# Patient Record
Sex: MALE | Race: WHITE
[De-identification: names, ages, dates, MRNs, and addresses within clinical notes are randomized per-mention and may not be internally consistent; named-entity substitution may affect disease eponyms.]

---

## 2018-08-03 ENCOUNTER — HOSPITAL ENCOUNTER (INPATIENT)
Dept: HOSPITAL 36 - ER | Age: 83
LOS: 10 days | Discharge: NURSING, REHAB OR LONG-TERM CARE | DRG: 885 | End: 2018-08-13
Attending: PSYCHIATRY & NEUROLOGY | Admitting: PSYCHIATRY & NEUROLOGY
Payer: MEDICARE

## 2018-08-03 VITALS — DIASTOLIC BLOOD PRESSURE: 75 MMHG | SYSTOLIC BLOOD PRESSURE: 144 MMHG

## 2018-08-03 DIAGNOSIS — F29: Primary | ICD-10-CM

## 2018-08-03 DIAGNOSIS — D50.9: ICD-10-CM

## 2018-08-03 DIAGNOSIS — I25.10: ICD-10-CM

## 2018-08-03 DIAGNOSIS — M19.90: ICD-10-CM

## 2018-08-03 DIAGNOSIS — K21.9: ICD-10-CM

## 2018-08-03 DIAGNOSIS — I10: ICD-10-CM

## 2018-08-03 DIAGNOSIS — I48.91: ICD-10-CM

## 2018-08-03 DIAGNOSIS — F32.9: ICD-10-CM

## 2018-08-03 DIAGNOSIS — F03.91: ICD-10-CM

## 2018-08-03 DIAGNOSIS — F41.9: ICD-10-CM

## 2018-08-03 DIAGNOSIS — Z82.49: ICD-10-CM

## 2018-08-03 LAB
ALBUMIN SERPL-MCNC: 3.7 GM/DL (ref 4.2–5.5)
ALBUMIN/GLOB SERPL: 1.2 {RATIO} (ref 1–1.8)
ALP SERPL-CCNC: 70 U/L (ref 34–104)
ALT SERPL-CCNC: 5 U/L (ref 7–52)
AMPHET UR-MCNC: NEGATIVE NG/ML
ANION GAP SERPL CALC-SCNC: 13 MMOL/L (ref 7–16)
APPEARANCE UR: CLEAR
AST SERPL-CCNC: 10 U/L (ref 13–39)
BACTERIA #/AREA URNS HPF: (no result) /HPF
BARBITURATES UR-MCNC: NEGATIVE UG/ML
BASOPHILS # BLD AUTO: 0 TH/CUMM (ref 0–0.2)
BASOPHILS NFR BLD AUTO: 0 % (ref 0–2)
BENZODIAZEPINES PNL UR: NEGATIVE
BILIRUB SERPL-MCNC: 1 MG/DL (ref 0.3–1)
BILIRUB UR-MCNC: NEGATIVE MG/DL
BUN SERPL-MCNC: 21 MG/DL (ref 7–25)
CALCIUM SERPL-MCNC: 9.3 MG/DL (ref 8.6–10.3)
CANNABINOIDS SERPL QL CFM: NEGATIVE
CHLORIDE SERPL-SCNC: 95 MEQ/L (ref 98–107)
CO2 SERPL-SCNC: 27.7 MEQ/L (ref 21–31)
COCAINE METAB.OTHER UR-MCNC: NEGATIVE NG/ML
COLOR UR: YELLOW
CREAT SERPL-MCNC: 1.1 MG/DL (ref 0.7–1.3)
EOSINOPHIL # BLD AUTO: 0.1 TH/CMM (ref 0.1–0.4)
EOSINOPHIL NFR BLD AUTO: 1 % (ref 0–5)
EPI CELLS URNS QL MICRO: (no result) /LPF
ERYTHROCYTE [DISTWIDTH] IN BLOOD BY AUTOMATED COUNT: 14.3 % (ref 11.5–20)
GLOBULIN SER-MCNC: 3.1 GM/DL
GLUCOSE SERPL-MCNC: 102 MG/DL (ref 70–105)
GLUCOSE UR STRIP-MCNC: NEGATIVE MG/DL
HCT VFR BLD CALC: 38.7 % (ref 41–60)
HGB BLD-MCNC: 13.1 GM/DL (ref 12–16)
KETONES UR STRIP-MCNC: 15 MG/DL
LEUKOCYTE ESTERASE UR-ACNC: (no result)
LYMPHOCYTE AB SER FC-ACNC: 0.9 TH/CMM (ref 1.5–3)
LYMPHOCYTES NFR BLD AUTO: 14 % (ref 20–50)
MCH RBC QN AUTO: 32.4 PG (ref 27–31)
MCHC RBC AUTO-ENTMCNC: 33.9 PG (ref 28–36)
MCV RBC AUTO: 95.6 FL (ref 80–99)
METHADONE UR CFM-MCNC: NEGATIVE NG/ML
METHAMPHET UR QL: NEGATIVE
MICRO URNS: YES
MONOCYTES # BLD AUTO: 0.4 TH/CMM (ref 0.3–1)
MONOCYTES NFR BLD AUTO: 6.7 % (ref 2–10)
NEUTROPHILS # BLD: 5.1 TH/CMM (ref 1.8–8)
NEUTROPHILS NFR BLD AUTO: 78.3 % (ref 40–80)
NITRITE UR QL STRIP: POSITIVE
OPIATES UR QL: NEGATIVE
PCP UR-MCNC: NEGATIVE UG/L
PH UR STRIP: 6 [PH] (ref 4.6–8)
PLATELET # BLD: 242 TH/CMM (ref 150–400)
PMV BLD AUTO: 6.4 FL
POTASSIUM SERPL-SCNC: 3.7 MEQ/L (ref 3.5–5.1)
PROT UR STRIP-MCNC: NEGATIVE MG/DL
RBC # BLD AUTO: 4.05 MIL/CMM (ref 3.8–5.8)
RBC # UR STRIP: NEGATIVE /UL
RBC #/AREA URNS HPF: (no result) /HPF (ref 0–5)
SODIUM SERPL-SCNC: 132 MEQ/L (ref 136–145)
SP GR UR STRIP: 1.02 (ref 1–1.03)
TRICYCLICS UR QL: NEGATIVE
URINALYSIS COMPLETE PNL UR: (no result)
UROBILINOGEN UR STRIP-ACNC: 2 E.U./DL (ref 0.2–1)
WBC # BLD AUTO: 6.5 TH/CMM (ref 4.8–10.8)
WBC #/AREA URNS HPF: (no result) /HPF (ref 0–5)

## 2018-08-03 PROCEDURE — Z7610: HCPCS

## 2018-08-03 NOTE — ED PHYSICIAN CHART
ED Chief Complaint/HPI





- Patient Information


Date Seen:: 08/03/18


Time Seen:: 16:05


Chief Complaint:: anxiety agitation


History of Present Illness:: 





88 yr old male with anxiety increased agitation from a facility pt denies any 

complaints


Allergies:: 


 Allergies











Allergy/AdvReac Type Severity Reaction Status Date / Time


 


No Known Allergies Allergy   Verified 08/03/18 16:01











Vitals:: 


 Vital Signs - 8 hr











  08/03/18





  15:55


 


Temp 97.8 F


 


HR 82


 


RR 16


 


/86


 


O2 Sat % 96











Historian:: Patient





ED Review of Systems





- Review of Systems


General/Constitutional: No fever, No chills, No weight loss, No weakness, No 

diaphoresis, No edema, No loss of appetite


Skin: No skin lesions, No rash, No bruising


Head: No headache, No light-headedness


Eyes: No loss of vision, No pain, No diplopia


ENT: No earache, No nasal drainage, No sore throat, No tinnitus


Neck: No neck pain, No swelling, No thyromegaly, No stiffness, No mass noted


Cardio Vascular: No chest pain, No palpitations, No PND, No orthopnea, No edema


Pulmonary: No SOB, No cough, No sputum, No wheezing


GI: No nausea, No vomiting, No diarrhea, No pain, No melena, No hematochezia, 

No constipation, No hematemesis


G/U: No dysuria, No frequency, No hematuria


Musculoskeletal: No bone or joint pain, No back pain, No muscle pain


Endocrine: No polyuria, No polydipsia


Psychiatric: No prior psych history, No depression, Anxiety, No suicidal 

ideation


Hematopoietic: No bruising, No lymphadenopathy


Allergic/Immuno: No urticaria, No angioedema


Neurological: No syncope, No focal symptoms, No weakness, No paresthesia, No 

headache, No seizure, No dizziness, No confusion, No vertigo





ED Past Medical History





- Past Medical History


Past Medical History: No significant medical hx





ED Physical Exam





- Physical Examination


General/Constitutional: Awake, Well-developed, well-nourished, Alert, No 

distress, GCS 15, Non-toxic appearing, Ambulatory


Head: Atraumatic


Eyes: Lids, conjuctiva normal, PERRL, EOMI


Skin: Nl inspection, No rash, No skin lesions, No ecchymosis, Well hydrated, No 

lymphadenopathy


ENMT: External ears, nose nl, Nasal exam nl, Lips, teeth, gums nl


Neck: Nontender, Full ROM w/o pain, No JVD, No nuchal rigidity, No bruit, No 

mass, No stridor


Respiratory: Nl effort/Exclusion, Clear to Auscultation, No Wheeze/Rhonchi/Rales


Cardio Vascular: RRR, No murmur, gallop, rubs, NL S1 S2


GI: No tenderness/rebounding/guarding, No organomegaly, No hernia, Normal BS's, 

Nondistended, No mass/bruits, No McBurney tenderness


: No CVA tenderness


Extremities: No tenderness or effusion, Full ROM, normal strength in all 

extremities, No edema, Normal digits & nails


Neuro/Psych: Alert/oriented, DTR's symmetric, Normal sensory exam, Normal motor 

strength, Judgement/insight normal, Mood normal, Normal gait, No focal deficits


Misc: Normal back, No paraspinal tenderness





ED Assessment





- Assessment


General Assessment: 





anxirty agitation





ED Septic Shock





- .


Is Septic Shock (SBP<90, OR Lactate>4 mmol\L) present?: No





- <6hrs of presentation:


Vital Signs: 


 Vital Signs - 8 hr











  08/03/18





  15:55


 


Temp 97.8 F


 


HR 82


 


RR 16


 


/86


 


O2 Sat % 96














ED Reassessment (Disposition)





- Diagnosis


Diagnosis:: 





anxiety agitation





- Patient Disposition


Discharge/Transfer:: Acute Care w/in this hosp


Condition at Disposition:: Stable

## 2018-08-04 NOTE — PSYCHIATRIC EVALUATION
DATE OF SERVICE:  08/03/2018



PSYCHIATRIC EVALUATION AND MENTAL STATUS EXAMINATION



IDENTIFYING DATA:  The patient is an 88-year-old male resident of [facility]. 

Information obtained by directly interviewing the patient as well as reviewing

the admission papers and they are reliable.



JUSTIFICATION OF HOSPITALIZATION:  The patient is admitted here on a voluntary

basis in view of his acute agitation, confusion, and disruptive behavior.



CHIEF COMPLAINT:  "I am the chosen one."



HISTORY OF PRESENT ILLNESS:  This is the first psychiatric hospitalization to

[facility] for this patient who is reported to have been very

agitated, confused, and disrupting.  The patient has been referred here from the

[facility] for stabilization.  Review of the chart indicated that the patient

has been diagnosed to have the weakness of the muscles, hypoosmolality, and

hyponatremia at one time and the patient is getting easily agitated and hence

the patient has been referred over here.  I did the interview the patient. 

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

anointed by the [place] and the patient is stating that he speaks 6 different

languages and he goes on and on.  The patient's insight is noted to be very

limited.  Sleep and appetite prior to the hospitalization are very poor.



PAST PSYCHIATRIC HISTORY:  The patient denies any prior psychiatric

hospitalizations or treatments.



MEDICAL HISTORY:  Physical examination is requested by Dr. Thaddeus Comer.



LEGAL PROBLEMS:  None at this time.



STRENGTH AND ASSETS:  The patient is motivated.



MENTAL STATUS EXAMINATION:  The patient is an 88-year-old, looking his stated

age, cooperative.  Eye contact is fair.  Mood is noted to be elated.  Affect is

labile.  Insight and judgment at this time are noted to be impaired.  Impulse

control seems to be limited.  Coping skills are noted to be limited.  The

patient has been having difficult time to cope with the stress.  Attention span

and concentration are noted to be poor.  Short term and long term memory

appeared to be fair, but the patient has been presenting with grandiose

delusions and going off on a tangent.



DIAGNOSTIC IMPRESSION:

AXIS I:

A.  Psychosis, not otherwise specified.

B.  Rule out bipolar disorder.

C.  Dementia and behavioral change secondary to dementia.

AXIS II:  None.

AXIS III:  As per Dr. Travis.



IMMEDIATE TREATMENT PLAN:  The patient is going to be observed on inpatient

unit, provided with supportive psychotherapy.  The patient is going to be

closely monitored and encouraged to verbalize the concerns rather than to act

out.  The patient is going to be discontinued off of the Lexapro gradually and

the patient is going to be placed on the Seroquel 12.5 mg, which is going to be

gradually increased.



ESTIMATED LENGTH OF STAY:  3-5 days.



DISCHARGE CRITERIA:  When he no longer a threat to self or others and be able to

cope up with the stress.





DD: 08/04/2018 09:16

DT: 08/04/2018 10:44

[facility]# [id number]  [id number]

## 2018-08-05 NOTE — PROGRESS NOTES
DATE:  08/05/2018



SUBJECTIVE:  Staff was spoken to.  The patient is interviewed.  Mood is noted to

be irritable.  Affect is constricted.  Insight and judgment are impaired. 

Impulse control is noted to be poor.  The patient is refusing to comply with the

medications.  The patient has been very demanding and insight is noted to be

very limited.



ASSESSMENT:  The patient is still impulsive and psychotic.



PLAN:  To continue the patient with the supportive therapy, encouraged the

patient to verbalize the concerns rather than to act out.





DD: 08/05/2018 10:19

DT: 08/05/2018 16:40

[facility]# [id number]  [id number]

## 2018-08-06 NOTE — GENERAL PROGRESS NOTE
Subjective





- Review of Systems


Events since last encounter: 





awake in no distress








Objective





- Results


Result Diagrams: 


 08/03/18 16:53





 08/03/18 16:53


Recent Labs: 


 Laboratory Last Values











WBC  6.5 Th/cmm (4.8-10.8)   08/03/18  16:53    


 


RBC  4.05 Mil/cmm (3.80-5.80)   08/03/18  16:53    


 


Hgb  13.1 gm/dL (12-16)   08/03/18  16:53    


 


Hct  38.7 % (41.0-60)  L  08/03/18  16:53    


 


MCV  95.6 fl (80-99)   08/03/18  16:53    


 


MCH  32.4 pg (27.0-31.0)  H  08/03/18  16:53    


 


MCHC Differential  33.9 pg (28.0-36.0)   08/03/18  16:53    


 


RDW  14.3 % (11.5-20.0)   08/03/18  16:53    


 


Plt Count  242 Th/cmm (150-400)   08/03/18  16:53    


 


MPV  6.4 fl  08/03/18  16:53    


 


Neutrophils %  78.3 % (40.0-80.0)   08/03/18  16:53    


 


Lymphocytes %  14.0 % (20.0-50.0)  L  08/03/18  16:53    


 


Monocytes %  6.7 % (2.0-10.0)   08/03/18  16:53    


 


Eosinophils %  1.0 % (0.0-5.0)   08/03/18  16:53    


 


Basophils %  0.0 % (0.0-2.0)   08/03/18  16:53    


 


Sodium  132 mEq/L (136-145)  L  08/03/18  16:53    


 


Potassium  3.7 mEq/L (3.5-5.1)   08/03/18  16:53    


 


Chloride  95 mEq/L ()  L  08/03/18  16:53    


 


Carbon Dioxide  27.7 mEq/L (21.0-31.0)   08/03/18  16:53    


 


Anion Gap  13.0  (7.0-16.0)   08/03/18  16:53    


 


BUN  21 mg/dL (7-25)   08/03/18  16:53    


 


Creatinine  1.1 mg/dL (0.7-1.3)   08/03/18  16:53    


 


Est GFR ( Amer)  TNP   08/03/18  16:53    


 


Est GFR (Non-Af Amer)  TNP   08/03/18  16:53    


 


BUN/Creatinine Ratio  19.1   08/03/18  16:53    


 


Glucose  102 mg/dL ()   08/03/18  16:53    


 


Calcium  9.3 mg/dL (8.6-10.3)   08/03/18  16:53    


 


Total Bilirubin  1.0 mg/dL (0.3-1.0)   08/03/18  16:53    


 


AST  10 U/L (13-39)  L  08/03/18  16:53    


 


ALT  5 U/L (7-52)  L  08/03/18  16:53    


 


Alkaline Phosphatase  70 U/L ()   08/03/18  16:53    


 


Total Protein  6.8 gm/dL (6.0-8.3)   08/03/18  16:53    


 


Albumin  3.7 gm/dL (4.2-5.5)  L  08/03/18  16:53    


 


Globulin  3.1 gm/dL  08/03/18  16:53    


 


Albumin/Globulin Ratio  1.2  (1.0-1.8)   08/03/18  16:53    


 


Urine Source  CLEAN C   08/03/18  17:20    


 


Urine Color  YELLOW   08/03/18  17:20    


 


Urine Clarity  CLEAR  (CLEAR)   08/03/18  17:20    


 


Urine pH  6.0  (4.6 - 8.0)   08/03/18  17:20    


 


Ur Specific Gravity  1.025  (1.005-1.030)   08/03/18  17:20    


 


Urine Protein  NEGATIVE mg/dL (NEGATIVE)   08/03/18  17:20    


 


Urine Glucose (UA)  NEGATIVE mg/dL (NEGATIVE)   08/03/18  17:20    


 


Urine Ketones  15 mg/dL (NEGATIVE)  H  08/03/18  17:20    


 


Urine Blood  NEGATIVE  (NEGATIVE)   08/03/18  17:20    


 


Urine Nitrate  POSITIVE  (NEGATIVE)  H  08/03/18  17:20    


 


Urine Bilirubin  NEGATIVE  (NEGATIVE)   08/03/18  17:20    


 


Urine Urobilinogen  2.0 E.U./dL (0.2 - 1.0)   08/03/18  17:20    


 


Ur Leukocyte Esterase  SMALL  (NEGATIVE)  H  08/03/18  17:20    


 


Urine RBC  0-2 /hpf (0-5)  H  08/03/18  17:20    


 


Urine WBC  6-10 /hpf (0-5)   08/03/18  17:20    


 


Ur Epithelial Cells  FEW /lpf (FEW)   08/03/18  17:20    


 


Urine Bacteria  1+ /hpf (NONE SEEN)  H  08/03/18  17:20    


 


Urine Mucus  FEW /lpf (FEW)   08/03/18  17:20    


 


Urine Opiates Screen  NEGATIVE  (NEGATIVE)   08/03/18  17:20    


 


Urine Methadone Screen  NEGATIVE  (NEGATIVE)   08/03/18  17:20    


 


Ur Barbiturates Screen  NEGATIVE  (NEGATIVE)   08/03/18  17:20    


 


Ur Tricyclics Screen  NEGATIVE  (NEGATIVE)   08/03/18  17:20    


 


Ur Phencyclidine Scrn  NEGATIVE  (NEGATIVE)   08/03/18  17:20    


 


Amphetamines Screen  NEGATIVE  (NEGATIVE)   08/03/18  17:20    


 


U Methamphetamines Scrn  NEGATIVE  (NEGATIVE)   08/03/18  17:20    


 


U Benzodiazepines Scrn  NEGATIVE  (NEGATIVE)   08/03/18  17:20    


 


U Cocaine Metab Screen  NEGATIVE  (NEGATIVE)   08/03/18  17:20    


 


U Cannabinoids Screen  NEGATIVE  (NEGATIVE)   08/03/18  17:20    














- Physical Exam


Vitals and I&O: 


 Vital Signs











Temp  97.2 F   08/06/18 15:19


 


Pulse  73   08/06/18 15:19


 


Resp  20   08/06/18 15:19


 


BP  104/65   08/06/18 15:19


 


Pulse Ox  97   08/06/18 15:19








 Intake & Output











 08/05/18 08/06/18 08/06/18





 18:59 06:59 18:59


 


Intake Total 1750  


 


Balance 1750  


 


Intake:   


 


  Oral 1750  


 


Other:   


 


  # Voids 3  


 


  # Bowel Movements 1  











Active Medications: 


Current Medications





Acetaminophen (Tylenol)  650 mg PO Q4HR PRN


   PRN Reason: Mild Pain / Temp above 100


   Stop: 10/02/18 23:47


Acetaminophen/Hydrocodone Bitart (Norco 5mg/325mg)  1 tab PO Q6H PRN


   PRN Reason: Pain (Moderate)


   Stop: 10/02/18 23:53


Al Hydrox/Mg Hydrox/Simethicone (Maalox)  30 ml PO Q4HR PRN


   PRN Reason: GI DISTRESS


   Stop: 10/02/18 23:47


Aspirin (Ecotrin)  81 mg PO DAILY [facility]


   Stop: 10/03/18 08:59


   Last Admin: 08/05/18 09:16 Dose:  Not Given


Bisacodyl (Dulcolax 10 Mg Supp)  10 mg RC Q72HR PRN


   PRN Reason: Constipation


   Stop: 10/02/18 23:53


Docusate Sodium (Colace)  250 mg PO DAILY [facility]


   Stop: 10/03/18 08:59


   Last Admin: 08/05/18 09:17 Dose:  Not Given


Donepezil HCl (Aricept)  10 mg PO HS [facility]


   Stop: 10/03/18 20:59


   Last Admin: 08/05/18 21:07 Dose:  10 mg


Escitalopram Oxalate (Lexapro)  5 mg PO DAILY [facility]; Protocol


   Stop: 10/03/18 08:59


   Last Admin: 08/05/18 09:17 Dose:  Not Given


Famotidine (Pepcid)  40 mg PO QAM [facility]


   Stop: 10/03/18 08:59


   Last Admin: 08/05/18 09:17 Dose:  Not Given


Ferrous Sulfate (Iron)  325 mg PO DAILY [facility]


   Stop: 10/03/18 08:59


   Last Admin: 08/05/18 09:17 Dose:  Not Given


Hydrochlorothiazide (Hctz)  25 mg PO QAM [facility]


   Stop: 10/03/18 08:59


   Last Admin: 08/05/18 09:17 Dose:  Not Given


Lorazepam (Ativan)  0.5 mg PO Q4HR PRN; Protocol


   PRN Reason: Anxiety


   Stop: 09/02/18 23:47


   Last Admin: 08/05/18 21:22 Dose:  0.5 mg


Magnesium Hydroxide (Milk Of Magnesia)  30 ml PO HS PRN


   PRN Reason: Constipation


Memantine (Namenda)  10 mg PO BID [facility]


   Stop: 10/03/18 08:59


   Last Admin: 08/05/18 18:01 Dose:  Not Given


Multivitamins/Vitamin C (Theragran)  1 tab PO DAILY [facility]


   Stop: 10/03/18 08:59


   Last Admin: 08/05/18 09:17 Dose:  Not Given


Quetiapine Fumarate (Seroquel)  25 mg PO BID [facility]; Protocol


   Stop: 10/03/18 08:59


   Last Admin: 08/05/18 18:01 Dose:  Not Given


Trazodone HCl (Desyrel)  25 mg PO HS APURVA; Protocol


   Stop: 10/03/18 20:59


   Last Admin: 08/05/18 21:07 Dose:  25 mg


Zolpidem Tartrate (Ambien)  5 mg PO HS PRN


   PRN Reason: Insomnia


   Stop: 10/02/18 23:47

## 2018-08-07 NOTE — PROGRESS NOTES
DATE:  08/06/2018



PSYCHIATRIC PROGRESS NOTE



SUBJECTIVE:  Staff was spoken to.  The patient is interviewed.  Mood is noted to

be irritable.  Affect is constricted.  Insight and judgment are noted to be

still impaired.  Impulse control is noted to be limited.  The patient has been

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

the dead bodies from the helicopter to the ____.  The patient's coping skills at

this time are noted to be impaired.  Impulse control is noted to be very poor. 

Coping skills are noted to be poor.  The patient has been having difficult time

to cope with the stress and the patient is going on a tangent.  The patient is

currently on trazodone, Ambien, and Seroquel and decided to discontinue the

trazodone tonight and continue the patient with Seroquel and Ambien and follow

up with the supportive therapy.



ASSESSMENT:  The patient is still psychotic and impulsive.



PLAN:  To continue the patient with the supportive therapy and follow.





DD: 08/06/2018 19:42

DT: 08/07/2018 02:35

JOB# [id number]  [id number]

## 2018-08-08 NOTE — GENERAL PROGRESS NOTE
Subjective





- Review of Systems


Events since last encounter: 





patient still psychotic


no fever 





Objective





- Results


Result Diagrams: 


 18 16:53





 18 16:53


Recent Labs: 


 Laboratory Last Values











WBC  6.5 Th/cmm (4.8-10.8)   18  16:53    


 


RBC  4.05 Mil/cmm (3.80-5.80)   18  16:53    


 


Hgb  13.1 gm/dL (12-16)   18  16:53    


 


Hct  38.7 % (41.0-60)  L  18  16:53    


 


MCV  95.6 fl (80-99)   18  16:53    


 


MCH  32.4 pg (27.0-31.0)  H  18  16:53    


 


MCHC Differential  33.9 pg (28.0-36.0)   18  16:53    


 


RDW  14.3 % (11.5-20.0)   18  16:53    


 


Plt Count  242 Th/cmm (150-400)   18  16:53    


 


MPV  6.4 fl  18  16:53    


 


Neutrophils %  78.3 % (40.0-80.0)   18  16:53    


 


Lymphocytes %  14.0 % (20.0-50.0)  L  18  16:53    


 


Monocytes %  6.7 % (2.0-10.0)   18  16:53    


 


Eosinophils %  1.0 % (0.0-5.0)   18  16:53    


 


Basophils %  0.0 % (0.0-2.0)   18  16:53    


 


Sodium  132 mEq/L (136-145)  L  18  16:53    


 


Potassium  3.7 mEq/L (3.5-5.1)   18  16:53    


 


Chloride  95 mEq/L ()  L  18  16:53    


 


Carbon Dioxide  27.7 mEq/L (21.0-31.0)   18  16:53    


 


Anion Gap  13.0  (7.0-16.0)   18  16:53    


 


BUN  21 mg/dL (7-25)   18  16:53    


 


Creatinine  1.1 mg/dL (0.7-1.3)   18  16:53    


 


Est GFR ( Amer)  TNP   18  16:53    


 


Est GFR (Non-Af Amer)  TNP   18  16:53    


 


BUN/Creatinine Ratio  19.1   18  16:53    


 


Glucose  102 mg/dL ()   18  16:53    


 


Calcium  9.3 mg/dL (8.6-10.3)   18  16:53    


 


Total Bilirubin  1.0 mg/dL (0.3-1.0)   18  16:53    


 


AST  10 U/L (13-39)  L  18  16:53    


 


ALT  5 U/L (7-52)  L  18  16:53    


 


Alkaline Phosphatase  70 U/L ()   18  16:53    


 


Total Protein  6.8 gm/dL (6.0-8.3)   18  16:53    


 


Albumin  3.7 gm/dL (4.2-5.5)  L  18  16:53    


 


Globulin  3.1 gm/dL  18  16:53    


 


Albumin/Globulin Ratio  1.2  (1.0-1.8)   18  16:53    


 


Urine Source  CLEAN C   18  17:20    


 


Urine Color  YELLOW   18  17:20    


 


Urine Clarity  CLEAR  (CLEAR)   18  17:20    


 


Urine pH  6.0  (4.6 - 8.0)   18  17:20    


 


Ur Specific Gravity  1.025  (1.005-1.030)   18  17:20    


 


Urine Protein  NEGATIVE mg/dL (NEGATIVE)   18  17:20    


 


Urine Glucose (UA)  NEGATIVE mg/dL (NEGATIVE)   18  17:20    


 


Urine Ketones  15 mg/dL (NEGATIVE)  H  18  17:20    


 


Urine Blood  NEGATIVE  (NEGATIVE)   18  17:20    


 


Urine Nitrate  POSITIVE  (NEGATIVE)  H  18  17:20    


 


Urine Bilirubin  NEGATIVE  (NEGATIVE)   18  17:20    


 


Urine Urobilinogen  2.0 E.U./dL (0.2 - 1.0)   18  17:20    


 


Ur Leukocyte Esterase  SMALL  (NEGATIVE)  H  18  17:20    


 


Urine RBC  0-2 /hpf (0-5)  H  18  17:20    


 


Urine WBC  6-10 /hpf (0-5)   18  17:20    


 


Ur Epithelial Cells  FEW /lpf (FEW)   18  17:20    


 


Urine Bacteria  1+ /hpf (NONE SEEN)  H  18  17:20    


 


Urine Mucus  FEW /lpf (FEW)   18  17:20    


 


Urine Opiates Screen  NEGATIVE  (NEGATIVE)   18  17:20    


 


Urine Methadone Screen  NEGATIVE  (NEGATIVE)   18  17:20    


 


Ur Barbiturates Screen  NEGATIVE  (NEGATIVE)   18  17:20    


 


Ur Tricyclics Screen  NEGATIVE  (NEGATIVE)   18  17:20    


 


Ur Phencyclidine Scrn  NEGATIVE  (NEGATIVE)   18  17:20    


 


Amphetamines Screen  NEGATIVE  (NEGATIVE)   18  17:20    


 


U Methamphetamines Scrn  NEGATIVE  (NEGATIVE)   18  17:20    


 


U Benzodiazepines Scrn  NEGATIVE  (NEGATIVE)   18  17:20    


 


U Cocaine Metab Screen  NEGATIVE  (NEGATIVE)   18  17:20    


 


U Cannabinoids Screen  NEGATIVE  (NEGATIVE)   18  17:20    














- Physical Exam


Vitals and I&O: 


 Vital Signs











Temp  97.8 F   18 05:01


 


Pulse  61   18 05:01


 


Resp  19   18 05:01


 


BP  120/74   18 05:01


 


Pulse Ox  98   18 05:01








 Intake & Output











 18





 18:59 06:59 18:59


 


Intake Total  500 


 


Balance  500 


 


Intake:   


 


  Oral  500 


 


Other:   


 


  # Voids  2 


 


  # Bowel Movements  1 











Active Medications: 


Current Medications





Acetaminophen (Tylenol)  650 mg PO Q4HR PRN


   PRN Reason: Mild Pain / Temp above 100


   Stop: 10/02/18 23:47


Acetaminophen/Hydrocodone Bitart (Norco 5mg/325mg)  1 tab PO Q6H PRN


   PRN Reason: Pain (Moderate)


   Stop: 10/02/18 23:53


Al Hydrox/Mg Hydrox/Simethicone (Maalox)  30 ml PO Q4HR PRN


   PRN Reason: GI DISTRESS


   Stop: 10/02/18 23:47


Aspirin (Ecotrin)  81 mg PO DAILY [facility]


   Stop: 10/03/18 08:59


   Last Admin: 18 19:28 Dose:  Not Given


Bisacodyl (Dulcolax 10 Mg Supp)  10 mg RC Q72HR PRN


   PRN Reason: Constipation


   Stop: 10/02/18 23:53


Docusate Sodium (Colace)  250 mg PO DAILY [facility]


   Stop: 10/03/18 08:59


   Last Admin: 18 19:28 Dose:  Not Given


Donepezil HCl (Aricept)  10 mg PO HS [facility]


   Stop: 10/03/18 20:59


   Last Admin: 18 21:24 Dose:  Not Given


Escitalopram Oxalate (Lexapro)  5 mg PO DAILY [facility]; Protocol


   Stop: 10/03/18 08:59


   Last Admin: 18 19:29 Dose:  Not Given


Famotidine (Pepcid)  40 mg PO QAM [facility]


   Stop: 10/03/18 08:59


   Last Admin: 18 19:29 Dose:  Not Given


Ferrous Sulfate (Iron)  325 mg PO DAILY [facility]


   Stop: 10/03/18 08:59


   Last Admin: 18 19:29 Dose:  Not Given


Hydrochlorothiazide (Hctz)  25 mg PO QAM [facility]


   Stop: 10/03/18 08:59


   Last Admin: 18 19:30 Dose:  Not Given


Lorazepam (Ativan)  0.5 mg PO Q4HR PRN; Protocol


   PRN Reason: Anxiety


   Stop: 18 23:47


   Last Admin: 18 21:22 Dose:  0.5 mg


Magnesium Hydroxide (Milk Of Magnesia)  30 ml PO HS PRN


   PRN Reason: Constipation


Memantine (Namenda)  10 mg PO BID [facility]


   Stop: 10/03/18 08:59


   Last Admin: 18 19:30 Dose:  Not Given


Multivitamins/Vitamin C (Theragran)  1 tab PO DAILY [facility]


   Stop: 10/03/18 08:59


   Last Admin: 18 19:31 Dose:  Not Given


Quetiapine Fumarate (Seroquel)  25 mg PO BID [facility]; Protocol


   Stop: 10/03/18 08:59


   Last Admin: 18 19:28 Dose:  Not Given


Zolpidem Tartrate (Ambien)  5 mg PO HS PRN


   PRN Reason: Insomnia


   Stop: 10/02/18 23:47











Nutritional Asmnt/Malnutr-PDOC





- Dietary Evaluation


Malnutrition Findings (Please click <Entered> for more info): 








Nutritional Asmnt/Malnutrition                             Start:  18 15:

09


Text:                                                      Status: Complete    

  


Freq:                                                                          

  


Protocol:                                                                      

  


 Document     18 15:09  LCHENG  (Rec: 18 15:15  LCSONALIG  [email or web address])


 Nutritional Asmnt/Malnutrition


     Patient General Information


      Nutritional Screening                      Moderate Risk


      Diagnosis                                  psychosis NOS


      Pertinent Medical Hx/Surgical Hx           OA, GERD, iron deficiency


                                                 anemia, HTN


      Subjective Information                     Pt seen sleeping in bed at


                                                 time of visit. Per EMR, PO


                                                 intake 50-75%.


      Current Diet Order/ Nutrition Support      regular, katie finely chopped


      Pertinent Medications                      coace, pepcid, iron, theragran


                                                 , seroquel


      Pertinent Labs                             8/3 Na 132, Cl 95, Alb 3.7


     Nutritional Hx/Data


      Height                                     1.8 m


      Height (Calculated Centimeters)            180.3


      Current Weight (lbs)                       83.915 kg


      Weight (Calculated Kilograms)              83.9


      Weight (Calculated Grams)                  92174.6


      Ideal Body Weight                          172


      Body Mass Index (BMI)                      25.7


     GI Symptoms


      GI Symptoms                                None


      Last BM                                    


      Difficult in:                              None


      Skin Integrity/Comment:                    intact


      Current %PO                                Fair (50-74%)


     Estimated Nutritional Goals


      Calories/Kcals/Kg                          23-27


      Kcals Calculated                           8277-4847


      Protein g/k.8


      Protein Calculated                         67


      Fluid: ml                                  1932-2268ml (1ml/kcal)


     Nutritional Problem


      1. Problem


       Problem                                   altered nutrition related labs


       Etiology                                  electrolytes imbalance


       Signs/Symptoms:                           Na 132, Cl 95


     Malnutrition Alert


      Is there a minimum of two criteria         No


       selected?                                 


       Query Text:Check all the applicable       


       criteria. A minimum of two criteria are   


       recommended for diagnosis of either       


       severe or non-severe malnutrition.        


     Malnutrition Related to Morbid Obesity


      Malnutrition related to morbid obesity     No


     Intervention/Recommendation


      Comments                                   1. Continue with current diet


                                                 as ordered. Assist pt with


                                                 meals as needed and encourage


                                                 oral intake.


                                                 2. Monitor PO intake, wt, labs


                                                 and skin integrity


                                                 3. F/U as low risk in 7 days,


                                                 , PO check 8/10


     Expected Outcomes/Goals


      Expected Outcomes/Goals                    1. PO intake to meet at least


                                                 75% of nutritional needs.


                                                 2. Wt stability, skin to


                                                 remain intact, labs to


                                                 approach WNL.

## 2018-08-08 NOTE — PROGRESS NOTES
DATE:  08/07/2018



PSYCHIATRIC PROGRESS NOTE



SUBJECTIVE:  Staff was spoken to.  The patient is interviewed.  Mood is noted to

be irritable.  Affect is constricted.  Coping skills are noted to be still poor.

 The patient has grandiose delusions.  The patient is talking about the

languages that he speaks and the things that he needs to do in the [place]. 

The patient has no insight into his illness.



ASSESSMENT:  The patient is still grossly psychotic.



PLAN:  To continue the patient with supportive therapy.  I encouraged the

patient to verbalize the concerns rather than to act out.  The patient is

reluctantly willing to accept the medications.





DD: 08/07/2018 19:20

DT: 08/08/2018 01:03

JOB# [id number]  [id number]

## 2018-08-09 NOTE — GENERAL PROGRESS NOTE
Subjective





- Review of Systems


Subjective: 





patient is agitated,


NAD





Objective





- Results


Result Diagrams: 


 18 16:53





 18 16:53


Recent Labs: 


 Laboratory Last Values











WBC  6.5 Th/cmm (4.8-10.8)   18  16:53    


 


RBC  4.05 Mil/cmm (3.80-5.80)   18  16:53    


 


Hgb  13.1 gm/dL (12-16)   18  16:53    


 


Hct  38.7 % (41.0-60)  L  18  16:53    


 


MCV  95.6 fl (80-99)   18  16:53    


 


MCH  32.4 pg (27.0-31.0)  H  18  16:53    


 


MCHC Differential  33.9 pg (28.0-36.0)   18  16:53    


 


RDW  14.3 % (11.5-20.0)   18  16:53    


 


Plt Count  242 Th/cmm (150-400)   18  16:53    


 


MPV  6.4 fl  18  16:53    


 


Neutrophils %  78.3 % (40.0-80.0)   18  16:53    


 


Lymphocytes %  14.0 % (20.0-50.0)  L  18  16:53    


 


Monocytes %  6.7 % (2.0-10.0)   18  16:53    


 


Eosinophils %  1.0 % (0.0-5.0)   18  16:53    


 


Basophils %  0.0 % (0.0-2.0)   18  16:53    


 


Sodium  132 mEq/L (136-145)  L  18  16:53    


 


Potassium  3.7 mEq/L (3.5-5.1)   18  16:53    


 


Chloride  95 mEq/L ()  L  18  16:53    


 


Carbon Dioxide  27.7 mEq/L (21.0-31.0)   18  16:53    


 


Anion Gap  13.0  (7.0-16.0)   18  16:53    


 


BUN  21 mg/dL (7-25)   18  16:53    


 


Creatinine  1.1 mg/dL (0.7-1.3)   18  16:53    


 


Est GFR ( Amer)  TNP   18  16:53    


 


Est GFR (Non-Af Amer)  TNP   18  16:53    


 


BUN/Creatinine Ratio  19.1   18  16:53    


 


Glucose  102 mg/dL ()   18  16:53    


 


Calcium  9.3 mg/dL (8.6-10.3)   18  16:53    


 


Total Bilirubin  1.0 mg/dL (0.3-1.0)   18  16:53    


 


AST  10 U/L (13-39)  L  18  16:53    


 


ALT  5 U/L (7-52)  L  18  16:53    


 


Alkaline Phosphatase  70 U/L ()   18  16:53    


 


Total Protein  6.8 gm/dL (6.0-8.3)   18  16:53    


 


Albumin  3.7 gm/dL (4.2-5.5)  L  18  16:53    


 


Globulin  3.1 gm/dL  18  16:53    


 


Albumin/Globulin Ratio  1.2  (1.0-1.8)   18  16:53    


 


Urine Source  CLEAN C   18  17:20    


 


Urine Color  YELLOW   18  17:20    


 


Urine Clarity  CLEAR  (CLEAR)   18  17:20    


 


Urine pH  6.0  (4.6 - 8.0)   18  17:20    


 


Ur Specific Gravity  1.025  (1.005-1.030)   18  17:20    


 


Urine Protein  NEGATIVE mg/dL (NEGATIVE)   18  17:20    


 


Urine Glucose (UA)  NEGATIVE mg/dL (NEGATIVE)   18  17:20    


 


Urine Ketones  15 mg/dL (NEGATIVE)  H  18  17:20    


 


Urine Blood  NEGATIVE  (NEGATIVE)   18  17:20    


 


Urine Nitrate  POSITIVE  (NEGATIVE)  H  18  17:20    


 


Urine Bilirubin  NEGATIVE  (NEGATIVE)   18  17:20    


 


Urine Urobilinogen  2.0 E.U./dL (0.2 - 1.0)   18  17:20    


 


Ur Leukocyte Esterase  SMALL  (NEGATIVE)  H  18  17:20    


 


Urine RBC  0-2 /hpf (0-5)  H  18  17:20    


 


Urine WBC  6-10 /hpf (0-5)   18  17:20    


 


Ur Epithelial Cells  FEW /lpf (FEW)   18  17:20    


 


Urine Bacteria  1+ /hpf (NONE SEEN)  H  18  17:20    


 


Urine Mucus  FEW /lpf (FEW)   18  17:20    


 


Urine Opiates Screen  NEGATIVE  (NEGATIVE)   18  17:20    


 


Urine Methadone Screen  NEGATIVE  (NEGATIVE)   18  17:20    


 


Ur Barbiturates Screen  NEGATIVE  (NEGATIVE)   18  17:20    


 


Ur Tricyclics Screen  NEGATIVE  (NEGATIVE)   18  17:20    


 


Ur Phencyclidine Scrn  NEGATIVE  (NEGATIVE)   18  17:20    


 


Amphetamines Screen  NEGATIVE  (NEGATIVE)   18  17:20    


 


U Methamphetamines Scrn  NEGATIVE  (NEGATIVE)   18  17:20    


 


U Benzodiazepines Scrn  NEGATIVE  (NEGATIVE)   18  17:20    


 


U Cocaine Metab Screen  NEGATIVE  (NEGATIVE)   18  17:20    


 


U Cannabinoids Screen  NEGATIVE  (NEGATIVE)   18  17:20    














- Physical Exam


Vitals and I&O: 


 Vital Signs











Temp  98.2 F   18 05:54


 


Pulse  81   18 05:54


 


Resp  18   18 05:54


 


BP  120/78   18 05:54


 


Pulse Ox  98   18 05:54








 Intake & Output











 18





 18:59 06:59 18:59


 


Intake Total  120 


 


Balance  120 


 


Intake:   


 


  Oral  120 


 


Other:   


 


  # Voids  3 


 


  # Bowel Movements  1 











Active Medications: 


Current Medications





Acetaminophen (Tylenol)  650 mg PO Q4HR PRN


   PRN Reason: Mild Pain / Temp above 100


   Stop: 10/02/18 23:47


Acetaminophen/Hydrocodone Bitart (Norco 5mg/325mg)  1 tab PO Q6H PRN


   PRN Reason: Pain (Moderate)


   Stop: 10/02/18 23:53


Al Hydrox/Mg Hydrox/Simethicone (Maalox)  30 ml PO Q4HR PRN


   PRN Reason: GI DISTRESS


   Stop: 10/02/18 23:47


Aspirin (Ecotrin)  81 mg PO DAILY [facility]


   Stop: 10/03/18 08:59


   Last Admin: 18 09:15 Dose:  Not Given


Bisacodyl (Dulcolax 10 Mg Supp)  10 mg RC Q72HR PRN


   PRN Reason: Constipation


   Stop: 10/02/18 23:53


Docusate Sodium (Colace)  250 mg PO DAILY [facility]


   Stop: 10/03/18 08:59


   Last Admin: 18 09:15 Dose:  Not Given


Donepezil HCl (Aricept)  10 mg PO HS [facility]


   Stop: 10/03/18 20:59


   Last Admin: 18 21:07 Dose:  10 mg


Escitalopram Oxalate (Lexapro)  5 mg PO DAILY [facility]; Protocol


   Stop: 10/03/18 08:59


   Last Admin: 18 09:15 Dose:  Not Given


Famotidine (Pepcid)  40 mg PO QAM [facility]


   Stop: 10/03/18 08:59


   Last Admin: 18 09:15 Dose:  Not Given


Ferrous Sulfate (Iron)  325 mg PO DAILY [facility]


   Stop: 10/03/18 08:59


   Last Admin: 18 09:15 Dose:  Not Given


Hydrochlorothiazide (Hctz)  25 mg PO QAM [facility]


   Stop: 10/03/18 08:59


   Last Admin: 18 09:15 Dose:  Not Given


Lorazepam (Ativan)  0.5 mg PO Q4HR PRN; Protocol


   PRN Reason: Anxiety


   Stop: 18 23:47


   Last Admin: 18 21:22 Dose:  0.5 mg


Magnesium Hydroxide (Milk Of Magnesia)  30 ml PO HS PRN


   PRN Reason: Constipation


Memantine (Namenda)  10 mg PO BID [facility]


   Stop: 10/03/18 08:59


   Last Admin: 18 09:15 Dose:  Not Given


Multivitamins/Vitamin C (Theragran)  1 tab PO DAILY [facility]


   Stop: 10/03/18 08:59


   Last Admin: 18 09:15 Dose:  Not Given


Quetiapine Fumarate (Seroquel)  25 mg PO BID [facility]; Protocol


   Stop: 10/03/18 08:59


   Last Admin: 18 09:15 Dose:  Not Given


Zolpidem Tartrate (Ambien)  5 mg PO HS PRN


   PRN Reason: Insomnia


   Stop: 10/02/18 23:47


   Last Admin: 18 21:07 Dose:  5 mg








General: Alert, Oriented x3, No acute distress


HEENT: Atraumatic, PERRLA, EOMI


Cardiovascular: Regular rate


Lungs: Clear to auscultation


Abdomen: Bowel sounds





Assessment/Plan





- Assessment


Assessment: 





psychotic





- Plan


Plan: 





cpm


will monitor 





Nutritional Asmnt/Malnutr-PDOC





- Dietary Evaluation


Malnutrition Findings (Please click <Entered> for more info): 








Nutritional Asmnt/Malnutrition                             Start:  18 15:

09


Text:                                                      Status: Complete    

  


Freq:                                                                          

  


Protocol:                                                                      

  


 Document     18 15:09  LCHENG  (Rec: 18 15:15  LCSONALIG  [email or web address])


 Nutritional Asmnt/Malnutrition


     Patient General Information


      Nutritional Screening                      Moderate Risk


      Diagnosis                                  psychosis NOS


      Pertinent Medical Hx/Surgical Hx           OA, GERD, iron deficiency


                                                 anemia, HTN


      Subjective Information                     Pt seen sleeping in bed at


                                                 time of visit. Per EMR, PO


                                                 intake 50-75%.


      Current Diet Order/ Nutrition Support      regular, katie finely chopped


      Pertinent Medications                      coace, pepcid, iron, theragran


                                                 , seroquel


      Pertinent Labs                             8/3 Na 132, Cl 95, Alb 3.7


     Nutritional Hx/Data


      Height                                     1.8 m


      Height (Calculated Centimeters)            180.3


      Current Weight (lbs)                       83.915 kg


      Weight (Calculated Kilograms)              83.9


      Weight (Calculated Grams)                  75341.6


      Ideal Body Weight                          172


      Body Mass Index (BMI)                      25.7


     GI Symptoms


      GI Symptoms                                None


      Last BM                                    8/


      Difficult in:                              None


      Skin Integrity/Comment:                    intact


      Current %PO                                Fair (50-74%)


     Estimated Nutritional Goals


      Calories/Kcals/Kg                          23-27


      Kcals Calculated                           4306-0050


      Protein g/k.8


      Protein Calculated                         67


      Fluid: ml                                  1932-2268ml (1ml/kcal)


     Nutritional Problem


      1. Problem


       Problem                                   altered nutrition related labs


       Etiology                                  electrolytes imbalance


       Signs/Symptoms:                           Na 132, Cl 95


     Malnutrition Alert


      Is there a minimum of two criteria         No


       selected?                                 


       Query Text:Check all the applicable       


       criteria. A minimum of two criteria are   


       recommended for diagnosis of either       


       severe or non-severe malnutrition.        


     Malnutrition Related to Morbid Obesity


      Malnutrition related to morbid obesity     No


     Intervention/Recommendation


      Comments                                   1. Continue with current diet


                                                 as ordered. Assist pt with


                                                 meals as needed and encourage


                                                 oral intake.


                                                 2. Monitor PO intake, wt, labs


                                                 and skin integrity


                                                 3. F/U as low risk in 7 days,


                                                 , PO check 8/10


     Expected Outcomes/Goals


      Expected Outcomes/Goals                    1. PO intake to meet at least


                                                 75% of nutritional needs.


                                                 2. Wt stability, skin to


                                                 remain intact, labs to


                                                 approach WNL.

## 2018-08-10 NOTE — PROGRESS NOTES
DATE:  08/09/2018



SUBJECTIVE:  Staff was spoken to.  The patient is interviewed.  Mood is noted to

be irritable.  Affect is constricted.  Coping skills are noted to be still poor.

 Sleep and appetite also noted to be very poor.  The patient has been having

difficult time to cope with the stress.  The patient is getting easily

frustrated.  The patient has been singing in [place].



ASSESSMENT:  The patient is still impulsive and has been delusional.



PLAN:  To continue the patient with the supportive therapy and I encouraged the

patient to verbalize the concerns rather than to act out.





DD: 08/09/2018 21:27

DT: 08/10/2018 00:40

JOB# [id number]  [id number]

## 2018-08-11 NOTE — PROGRESS NOTES
DATE:  08/10/2018



PSYCHIATRIC PROGRESS NOTE



SUBJECTIVE:  Staff was spoken to.  The patient is interviewed.  Mood is noted to

be irritable.  Affect is constricted.  Insight and judgment are noted to be

still impaired.  Impulse control is noted to be limited.  Coping skills are

noted to be limited.  The patient has been reluctant to comply with the

medication.  The patient has been singing and the patient continues to be

grandiose, but the aggression seems to be coming under control.  No side effects

to the medications are noted today.



ASSESSMENT:  The patient is still paranoid.



PLAN:  To continue the patient's current medications.  I encouraged the patient

to verbalize the concerns rather than to act out.





DD: 08/10/2018 19:19

DT: 08/11/2018 00:04

JOB# [id number]  [id number]

## 2018-08-11 NOTE — PROGRESS NOTES
DATE:  08/11/2018



SUBJECTIVE:  The patient was seen in his room.  The patient is asleep, but

easily arousable.  The patient appears to be guarded with episodes of

irritability.  Otherwise, the patient denies any suicidal or homicidal thoughts.

 The patient is in no acute distress.



OBJECTIVE:

VITAL SIGNS:  Temperature 97.6, heart rate of 80, blood pressure 132/68,

respirations of 19, 98% on room air.

HEENT:  Head is atraumatic and normocephalic.  Eyes:  Bilateral conjunctivae are

clear.  Bilateral pupils are equally round, reactive.

NECK:  Supple.  No JVD.

CARDIOVASCULAR:  S1 and S2, without murmur.

PULMONARY:  Clear to auscultation.

GASTROINTESTINAL:  Soft and nontender without guarding.  Positive bowel sounds.

MUSCULOSKELETAL:  No clubbing.  No cyanosis noted.



ASSESSMENT:

1.  Dementia.

2.  Iron deficiency anemia.

3.  Hypertension.

4.  Gastroesophageal reflux disease.



PLAN:  We will keep the patient inpatient Psychiatric unit.  We will follow up

with the psychiatrist to monitor the patient's condition and behaviors. 

Treatment plans were discussed with the patient's nurse.  Treatment plans were

discussed with Dr. Travis.





DD: 08/11/2018 10:44

DT: 08/11/2018 22:45

JOB# [id number]  [id number]

## 2018-08-12 NOTE — PROGRESS NOTES
DATE:  08/12/2018



SUBJECTIVE:  Staff was spoken to.  The patient is interviewed.  Mood is noted to

be less irritable.  The patient has complained with the medications this

morning.  Insight and judgment noted to be improving, the grandiosity is coming

down.  No side effects to the medications are noted.  The patient has been

stabilizing.  Plan to coordinate the care with the staff members and then look

for possible discharge tomorrow.





DD: 08/12/2018 11:59

DT: 08/12/2018 14:26

JOB# [id number]  [id number]

## 2018-08-12 NOTE — PROGRESS NOTES
DATE:  08/11/2018



SUBJECTIVE:  Staff was spoken to.  The patient is interviewed.  Mood is noted to

be irritable.  Affect is constricted.  Coping skills are noted to be still poor.

 Sleep and appetite are also to be limited.  No side effects to the medications

are noted, but the patient has been consistently refusing to comply with the

medications stating that there is no reason for him to be on the medication

because he is doing fine.  The patient is singing and the patient has been so

far not giving an opportunity where we need to medicate him ____.



ASSESSMENT:  The patient is still impulsive.



PLAN:  To continue the patient with the supportive therapy, I encouraged the

patient to verbalize the concerns rather than to act out.





DD: 08/11/2018 18:18

DT: 08/12/2018 01:38

[facility]# [id number]  [id number]

## 2018-08-12 NOTE — GENERAL PROGRESS NOTE
Subjective





- Review of Systems


Events since last encounter: 





patient irritable confused


denies pain 


Subjective: 





patient is agitated,


NAD





Objective





- Results


Result Diagrams: 


 18 16:53





 18 16:53


Recent Labs: 


 Laboratory Last Values











WBC  6.5 Th/cmm (4.8-10.8)   18  16:53    


 


RBC  4.05 Mil/cmm (3.80-5.80)   18  16:53    


 


Hgb  13.1 gm/dL (12-16)   18  16:53    


 


Hct  38.7 % (41.0-60)  L  18  16:53    


 


MCV  95.6 fl (80-99)   18  16:53    


 


MCH  32.4 pg (27.0-31.0)  H  18  16:53    


 


MCHC Differential  33.9 pg (28.0-36.0)   18  16:53    


 


RDW  14.3 % (11.5-20.0)   18  16:53    


 


Plt Count  242 Th/cmm (150-400)   18  16:53    


 


MPV  6.4 fl  18  16:53    


 


Neutrophils %  78.3 % (40.0-80.0)   18  16:53    


 


Lymphocytes %  14.0 % (20.0-50.0)  L  18  16:53    


 


Monocytes %  6.7 % (2.0-10.0)   18  16:53    


 


Eosinophils %  1.0 % (0.0-5.0)   18  16:53    


 


Basophils %  0.0 % (0.0-2.0)   18  16:53    


 


Sodium  132 mEq/L (136-145)  L  18  16:53    


 


Potassium  3.7 mEq/L (3.5-5.1)   18  16:53    


 


Chloride  95 mEq/L ()  L  18  16:53    


 


Carbon Dioxide  27.7 mEq/L (21.0-31.0)   18  16:53    


 


Anion Gap  13.0  (7.0-16.0)   18  16:53    


 


BUN  21 mg/dL (7-25)   18  16:53    


 


Creatinine  1.1 mg/dL (0.7-1.3)   18  16:53    


 


Est GFR ( Amer)  TNP   18  16:53    


 


Est GFR (Non-Af Amer)  TNP   18  16:53    


 


BUN/Creatinine Ratio  19.1   18  16:53    


 


Glucose  102 mg/dL ()   18  16:53    


 


Calcium  9.3 mg/dL (8.6-10.3)   18  16:53    


 


Total Bilirubin  1.0 mg/dL (0.3-1.0)   18  16:53    


 


AST  10 U/L (13-39)  L  18  16:53    


 


ALT  5 U/L (7-52)  L  18  16:53    


 


Alkaline Phosphatase  70 U/L ()   18  16:53    


 


Total Protein  6.8 gm/dL (6.0-8.3)   18  16:53    


 


Albumin  3.7 gm/dL (4.2-5.5)  L  18  16:53    


 


Globulin  3.1 gm/dL  18  16:53    


 


Albumin/Globulin Ratio  1.2  (1.0-1.8)   18  16:53    


 


Urine Source  CLEAN C   18  17:20    


 


Urine Color  YELLOW   18  17:20    


 


Urine Clarity  CLEAR  (CLEAR)   18  17:20    


 


Urine pH  6.0  (4.6 - 8.0)   18  17:20    


 


Ur Specific Gravity  1.025  (1.005-1.030)   18  17:20    


 


Urine Protein  NEGATIVE mg/dL (NEGATIVE)   18  17:20    


 


Urine Glucose (UA)  NEGATIVE mg/dL (NEGATIVE)   18  17:20    


 


Urine Ketones  15 mg/dL (NEGATIVE)  H  18  17:20    


 


Urine Blood  NEGATIVE  (NEGATIVE)   18  17:20    


 


Urine Nitrate  POSITIVE  (NEGATIVE)  H  18  17:20    


 


Urine Bilirubin  NEGATIVE  (NEGATIVE)   18  17:20    


 


Urine Urobilinogen  2.0 E.U./dL (0.2 - 1.0)   18  17:20    


 


Ur Leukocyte Esterase  SMALL  (NEGATIVE)  H  18  17:20    


 


Urine RBC  0-2 /hpf (0-5)  H  18  17:20    


 


Urine WBC  6-10 /hpf (0-5)   18  17:20    


 


Ur Epithelial Cells  FEW /lpf (FEW)   18  17:20    


 


Urine Bacteria  1+ /hpf (NONE SEEN)  H  18  17:20    


 


Urine Mucus  FEW /lpf (FEW)   18  17:20    


 


Urine Opiates Screen  NEGATIVE  (NEGATIVE)   18  17:20    


 


Urine Methadone Screen  NEGATIVE  (NEGATIVE)   18  17:20    


 


Ur Barbiturates Screen  NEGATIVE  (NEGATIVE)   18  17:20    


 


Ur Tricyclics Screen  NEGATIVE  (NEGATIVE)   18  17:20    


 


Ur Phencyclidine Scrn  NEGATIVE  (NEGATIVE)   18  17:20    


 


Amphetamines Screen  NEGATIVE  (NEGATIVE)   18  17:20    


 


U Methamphetamines Scrn  NEGATIVE  (NEGATIVE)   18  17:20    


 


U Benzodiazepines Scrn  NEGATIVE  (NEGATIVE)   18  17:20    


 


U Cocaine Metab Screen  NEGATIVE  (NEGATIVE)   18  17:20    


 


U Cannabinoids Screen  NEGATIVE  (NEGATIVE)   18  17:20    














- Physical Exam


Vitals and I&O: 


 Vital Signs











Temp  97.9 F   18 08:00


 


Pulse  86   18 08:00


 


Resp  18   18 08:00


 


BP  131/75   18 09:39


 


Pulse Ox  97   18 08:00








 Intake & Output











 18





 18:59 06:59 18:59


 


Intake Total  500 


 


Balance  500 


 


Intake:   


 


  Oral  500 


 


Other:   


 


  # Voids  3 


 


  # Bowel Movements  0 











Active Medications: 


Current Medications





Acetaminophen (Tylenol)  650 mg PO Q4HR PRN


   PRN Reason: Mild Pain / Temp above 100


   Stop: 10/02/18 23:47


Al Hydrox/Mg Hydrox/Simethicone (Maalox)  30 ml PO Q4HR PRN


   PRN Reason: GI DISTRESS


   Stop: 10/02/18 23:47


Aspirin (Ecotrin)  81 mg PO DAILY AP[facility]VA


   Stop: 10/03/18 08:59


   Last Admin: 18 09:39 Dose:  81 mg


Bisacodyl (Dulcolax 10 Mg Supp)  10 mg RC Q72HR PRN


   PRN Reason: Constipation


   Stop: 10/02/18 23:53


Docusate Sodium (Colace)  250 mg PO DAILY [facility]


   Stop: 10/03/18 08:59


   Last Admin: 18 09:39 Dose:  250 mg


Donepezil HCl (Aricept)  10 mg PO HS [facility]


   Stop: 10/03/18 20:59


   Last Admin: 18 21:55 Dose:  Not Given


Escitalopram Oxalate (Lexapro)  5 mg PO DAILY [facility]; Protocol


   Stop: 10/03/18 08:59


   Last Admin: 18 09:38 Dose:  5 mg


Famotidine (Pepcid)  40 mg PO QAM [facility]


   Stop: 10/03/18 08:59


   Last Admin: 18 09:39 Dose:  40 mg


Ferrous Sulfate (Iron)  325 mg PO DAILY [facility]


   Stop: 10/03/18 08:59


   Last Admin: 18 09:39 Dose:  325 mg


Hydrochlorothiazide (Hctz)  25 mg PO QAM [facility]


   Stop: 10/03/18 08:59


   Last Admin: 18 09:39 Dose:  25 mg


Magnesium Hydroxide (Milk Of Magnesia)  30 ml PO HS PRN


   PRN Reason: Constipation


Memantine (Namenda)  10 mg PO BID [facility]


   Stop: 10/03/18 08:59


   Last Admin: 18 09:39 Dose:  10 mg


Multivitamins/Vitamin C (Theragran)  1 tab PO DAILY [facility]


   Stop: 10/03/18 08:59


   Last Admin: 18 09:41 Dose:  1 tab


Quetiapine Fumarate (Seroquel)  25 mg PO BID [facility]; Protocol


   Stop: 10/03/18 08:59


   Last Admin: 18 09:41 Dose:  25 mg








General: Alert, Oriented x3, No acute distress


HEENT: Atraumatic, PERRLA, EOMI


Cardiovascular: Regular rate


Lungs: Clear to auscultation


Abdomen: Bowel sounds





Assessment/Plan





- Assessment


Assessment: 





psychotic





- Plan


Plan: 





cpm


will monitor 





Nutritional Asmnt/Malnutr-PDOC





- Dietary Evaluation


Malnutrition Findings (Please click <Entered> for more info): 








Nutritional Asmnt/Malnutrition                             Start:  18 15:

09


Text:                                                      Status: Complete    

  


Freq:                                                                          

  


Protocol:                                                                      

  


 Document     18 15:09  YANY  (Rec: 18 15:15  [facility])


 Nutritional Asmnt/Malnutrition


     Patient General Information


      Nutritional Screening                      Moderate Risk


      Diagnosis                                  psychosis NOS


      Pertinent Medical Hx/Surgical Hx           OA, GERD, iron deficiency


                                                 anemia, HTN


      Subjective Information                     Pt seen sleeping in bed at


                                                 time of visit. Per EMR, PO


                                                 intake 50-75%.


      Current Diet Order/ Nutrition Support      regular, katie finely chopped


      Pertinent Medications                      coace, pepcid, iron, theragran


                                                 , seroquel


      Pertinent Labs                             8/3 Na 132, Cl 95, Alb 3.7


     Nutritional Hx/Data


      Height                                     1.8 m


      Height (Calculated Centimeters)            180.3


      Current Weight (lbs)                       83.915 kg


      Weight (Calculated Kilograms)              83.9


      Weight (Calculated Grams)                  26179.6


      Ideal Body Weight                          172


      Body Mass Index (BMI)                      25.7


     GI Symptoms


      GI Symptoms                                None


      Last BM                                    


      Difficult in:                              None


      Skin Integrity/Comment:                    intact


      Current %PO                                Fair (50-74%)


     Estimated Nutritional Goals


      Calories/Kcals/Kg                          23-27


      Kcals Calculated                           3924-7153


      Protein g/k.8


      Protein Calculated                         67


      Fluid: ml                                  1932-2268ml (1ml/kcal)


     Nutritional Problem


      1. Problem


       Problem                                   altered nutrition related labs


       Etiology                                  electrolytes imbalance


       Signs/Symptoms:                           Na 132, Cl 95


     Malnutrition Alert


      Is there a minimum of two criteria         No


       selected?                                 


       Query Text:Check all the applicable       


       criteria. A minimum of two criteria are   


       recommended for diagnosis of either       


       severe or non-severe malnutrition.        


     Malnutrition Related to Morbid Obesity


      Malnutrition related to morbid obesity     No


     Intervention/Recommendation


      Comments                                   1. Continue with current diet


                                                 as ordered. Assist pt with


                                                 meals as needed and encourage


                                                 oral intake.


                                                 2. Monitor PO intake, wt, labs


                                                 and skin integrity


                                                 3. F/U as low risk in 7 days,


                                                 , PO check 8/10


     Expected Outcomes/Goals


      Expected Outcomes/Goals                    1. PO intake to meet at least


                                                 75% of nutritional needs.


                                                 2. Wt stability, skin to


                                                 remain intact, labs to


                                                 approach WNL.

## 2018-08-13 NOTE — PROGRESS NOTES
DATE:  08/13/2018



SUBJECTIVE:  Staff was spoken to.  The patient is interviewed.  Mood is noted to

be less irritable.  Affect is appropriate.  Not suicidal or homicidal.  Insight

and judgment are noted to be improving.  Impulse control seems to be fair.  No

side effects to the medications are noted.  The patient has been having

difficult time to cope with the stress, but the patient is not getting to a

point where he has to be medicated against his will.  No side effects to the

p.r.n. dose of medications are noted so far.



ASSESSMENT:  The patient is stabilizing.



PLAN:  If there is placement available, possibly the patient is going to be

discharged to the placement.





DD: 08/13/2018 10:41

DT: 08/13/2018 21:13

JOB# [id number]  [id number]

## 2019-07-29 ENCOUNTER — HOSPITAL ENCOUNTER (INPATIENT)
Dept: HOSPITAL 36 - ER | Age: 84
LOS: 9 days | Discharge: NURSING, REHAB OR LONG-TERM CARE | DRG: 885 | End: 2019-08-07
Attending: PSYCHIATRY & NEUROLOGY | Admitting: PSYCHIATRY & NEUROLOGY
Payer: MEDICARE

## 2019-07-29 VITALS — SYSTOLIC BLOOD PRESSURE: 157 MMHG | DIASTOLIC BLOOD PRESSURE: 81 MMHG

## 2019-07-29 DIAGNOSIS — E87.1: ICD-10-CM

## 2019-07-29 DIAGNOSIS — F20.9: ICD-10-CM

## 2019-07-29 DIAGNOSIS — F29: Primary | ICD-10-CM

## 2019-07-29 DIAGNOSIS — G89.4: ICD-10-CM

## 2019-07-29 DIAGNOSIS — I10: ICD-10-CM

## 2019-07-29 DIAGNOSIS — G30.9: ICD-10-CM

## 2019-07-29 DIAGNOSIS — I48.2: ICD-10-CM

## 2019-07-29 DIAGNOSIS — K21.9: ICD-10-CM

## 2019-07-29 DIAGNOSIS — F02.81: ICD-10-CM

## 2019-07-29 DIAGNOSIS — M19.90: ICD-10-CM

## 2019-07-29 DIAGNOSIS — Z90.49: ICD-10-CM

## 2019-07-29 DIAGNOSIS — N39.0: ICD-10-CM

## 2019-07-29 DIAGNOSIS — R73.9: ICD-10-CM

## 2019-07-29 DIAGNOSIS — Z87.11: ICD-10-CM

## 2019-07-29 LAB
ALBUMIN SERPL-MCNC: 3.8 GM/DL (ref 4.2–5.5)
ALBUMIN/GLOB SERPL: 1.4 {RATIO} (ref 1–1.8)
ALP SERPL-CCNC: 74 U/L (ref 34–104)
ALT SERPL-CCNC: 6 U/L (ref 7–52)
ANION GAP SERPL CALC-SCNC: 9 MMOL/L (ref 7–16)
APPEARANCE UR: (no result)
AST SERPL-CCNC: 13 U/L (ref 13–39)
BACTERIA #/AREA URNS HPF: (no result) /HPF
BASOPHILS # BLD AUTO: 0 TH/CUMM (ref 0–0.2)
BASOPHILS NFR BLD AUTO: 0.2 % (ref 0–2)
BILIRUB SERPL-MCNC: 0.7 MG/DL (ref 0.3–1)
BILIRUB UR-MCNC: NEGATIVE MG/DL
BUN SERPL-MCNC: 15 MG/DL (ref 7–25)
CALCIUM SERPL-MCNC: 9.3 MG/DL (ref 8.6–10.3)
CHLORIDE SERPL-SCNC: 92 MEQ/L (ref 98–107)
CO2 SERPL-SCNC: 30.7 MEQ/L (ref 21–31)
COLOR UR: YELLOW
CREAT SERPL-MCNC: 0.9 MG/DL (ref 0.7–1.3)
EOSINOPHIL # BLD AUTO: 0.1 TH/CMM (ref 0.1–0.4)
EOSINOPHIL NFR BLD AUTO: 1.4 % (ref 0–5)
EPI CELLS URNS QL MICRO: (no result) /LPF
ERYTHROCYTE [DISTWIDTH] IN BLOOD BY AUTOMATED COUNT: 13.9 % (ref 11.5–20)
GLOBULIN SER-MCNC: 2.8 GM/DL
GLUCOSE SERPL-MCNC: 110 MG/DL (ref 70–105)
GLUCOSE UR STRIP-MCNC: NEGATIVE MG/DL
HCT VFR BLD CALC: 37.5 % (ref 41–60)
HGB BLD-MCNC: 12.6 GM/DL (ref 12–16)
INR PPP: 1.05 (ref 0.5–1.4)
KETONES UR STRIP-MCNC: NEGATIVE MG/DL
LEUKOCYTE ESTERASE UR-ACNC: (no result)
LYMPHOCYTE AB SER FC-ACNC: 1.2 TH/CMM (ref 1.5–3)
LYMPHOCYTES NFR BLD AUTO: 25.5 % (ref 20–50)
MCH RBC QN AUTO: 31.7 PG (ref 27–31)
MCHC RBC AUTO-ENTMCNC: 33.6 PG (ref 28–36)
MCV RBC AUTO: 94.3 FL (ref 80–99)
MICRO URNS: YES
MONOCYTES # BLD AUTO: 0.4 TH/CMM (ref 0.3–1)
MONOCYTES NFR BLD AUTO: 8.8 % (ref 2–10)
NEUTROPHILS # BLD: 3 TH/CMM (ref 1.8–8)
NEUTROPHILS NFR BLD AUTO: 64.1 % (ref 40–80)
NITRITE UR QL STRIP: POSITIVE
PH UR STRIP: 5.5 [PH] (ref 4.6–8)
PLATELET # BLD: 224 TH/CMM (ref 150–400)
POTASSIUM SERPL-SCNC: 3.7 MEQ/L (ref 3.5–5.1)
PROT UR STRIP-MCNC: NEGATIVE MG/DL
RBC # BLD AUTO: 3.98 MIL/CMM (ref 3.8–5.8)
RBC # UR STRIP: (no result) /UL
RBC #/AREA URNS HPF: (no result) /HPF (ref 0–5)
SODIUM SERPL-SCNC: 128 MEQ/L (ref 136–145)
SP GR UR STRIP: 1.02 (ref 1–1.03)
URINALYSIS COMPLETE PNL UR: (no result)
UROBILINOGEN UR STRIP-ACNC: 0.2 E.U./DL (ref 0.2–1)
WBC # BLD AUTO: 4.7 TH/CMM (ref 4.8–10.8)
WBC #/AREA URNS HPF: (no result) /HPF (ref 0–5)

## 2019-07-29 PROCEDURE — Z7610: HCPCS

## 2019-07-29 NOTE — ED PHYSICIAN CHART
ED Chief Complaint/HPI





- Patient Information


Date Seen:: 07/29/19


Time Seen:: 18:30


Chief Complaint:: Increased agitation.


History of Present Illness:: 





Brought in by ambulance from nursing facility because pt has been noticed to 

have increased agitation. Pt speaks clearly. Pt denies any bodily pain or 

discomfort. Pt has dementia and is not fully cooperative; thus, H & P are 

limited. Info is primarily from review of limited transfer documents.


Allergies:: 


 Allergies











Allergy/AdvReac Type Severity Reaction Status Date / Time


 


No Known Allergies Allergy   Verified 08/03/18 16:01











Vitals:: 





see Nurse Note.


Historian:: Patient, Medical Records (from transferring facility.)


Family MD/PCP:: 





Barbara Cole/Thaddeus





LMP:: 





N/A


Review:: Nurse's Note Reviewed, Transfer documents Reviewed





ED Review of Systems





- Review of Systems


General/Constitutional: Other (Pt does not cooperate to provide reliable info 

for ROS.)





ED Past Medical History





- Past Medical History


Past Medical History: HTN, PUD/GERD, Dementia (with Alzheimer's disease), Other 

(chronic anemia. Chronic atrial fibrillation.)


Family History: Other (Pt does not cooperate to provide info for FHx.)


Social History: Non Smoker, No Alcohol, No Drug Use, Care Facility


Surgical History: Appendectomy


Psychiatricy History: Dementia, Other (schizoaffective disorder.)


Medication: Reviewed





Family Medical History





- Family Member


  ** Mother


History Unknown: Yes


Ethnicity: Non-


Hx Family Cancer:  (Unknown)


Hx Family Coronary Artery Disease:  (Unknown)


Hx Family Congestive Heart Failure:  (Unknown)


Hx Family Hypertension:  (Unknown)


Hx Family Stroke:  (Unknown)


Hx Family Diabetes:  (Unknown)


Hx Family Seizures:  (Unknown)


Hx Family Dementia:  (Unknown)


Hx Family AIDS:  (Unknown)


Hx Family COPD:  (Unknown)


Hx Family Hepatitis:  (Unknown)


Hx Family Psychiatric Problems:  (Unknown)


Hx Family Tuberculosis:  (Unknown)





  ** Father


History Unknown: Yes





ED Physical Exam





- Physical Examination


General/Constitutional: Awake, Well-developed, well-nourished (elderly male), 

Alert, No distress, Non-toxic appearing


Other Gen/Cons comments:: 





Breathes comfortably, speaks clearly, but is not fully cooperative.


Head: Atraumatic


Eyes: Lids, conjuctiva normal, PERRL, EOMI


Skin: Well hydrated, No lymphadenopathy


ENMT: External ears, nose nl, Nasal exam nl, Oropharynx nl


Neck: Nontender, Full ROM w/o pain, No JVD, No nuchal rigidity, No mass, No 

stridor


Respiratory: Clear to Auscultation, No Wheeze/Rhonchi/Rales


Cardio Vascular: RRR, No murmur, gallop, rubs


GI: No tenderness/rebounding/guarding, No organomegaly, Normal BS's, 

Nondistended, No mass/bruits


Extremities: No tenderness or effusion, No edema


Neuro/Psych: Alert/oriented (knows his name, that he is in a hospital, and that 

it is July.)


Other Neuro/Psych comments:: 





Spontaneous movements noticed in all 4 extremities. Pt does not cooperate for 

full neurological exam.





ED Labs/Radiology/EKG Results





- Lab Results


Results: 





 Laboratory Tests











  07/29/19 07/29/19 07/29/19





  13:45 18:57 18:57


 


WBC   4.7 L 


 


RBC   3.98 


 


Hgb   12.6 


 


Hct   37.5 L 


 


MCV   94.3 


 


MCH   31.7 H 


 


MCHC Differential   33.6 


 


RDW   13.9 


 


Plt Count   224 


 


MPV   5.8 


 


Neutrophils %   64.1 


 


Lymphocytes %   25.5 


 


Monocytes %   8.8 


 


Eosinophils %   1.4 


 


Basophils %   0.2 


 


PT    10.9


 


INR    1.05


 


PTT (Actin FS)    29.4


 


Sodium   


 


Potassium   


 


Chloride   


 


Carbon Dioxide   


 


Anion Gap   


 


BUN   


 


Creatinine   


 


Est GFR ( Amer)   


 


Est GFR (Non-Af Amer)   


 


BUN/Creatinine Ratio   


 


Glucose   


 


Calcium   


 


Total Bilirubin   


 


AST   


 


ALT   


 


Alkaline Phosphatase   


 


Total Protein   


 


Albumin   


 


Globulin   


 


Albumin/Globulin Ratio   


 


Urine Color  YELLOW  


 


Urine Clarity  CLOUDY  


 


Urine pH  5.5  


 


Ur Specific Gravity  1.025  


 


Urine Protein  NEGATIVE  


 


Urine Glucose (UA)  NEGATIVE  


 


Urine Ketones  NEGATIVE  


 


Urine Blood  TRACE  


 


Urine Nitrate  POSITIVE H  


 


Urine Bilirubin  NEGATIVE  


 


Urine Urobilinogen  0.2  


 


Ur Leukocyte Esterase  SMALL H  














  07/29/19





  18:57


 


WBC 


 


RBC 


 


Hgb 


 


Hct 


 


MCV 


 


MCH 


 


MCHC Differential 


 


RDW 


 


Plt Count 


 


MPV 


 


Neutrophils % 


 


Lymphocytes % 


 


Monocytes % 


 


Eosinophils % 


 


Basophils % 


 


PT 


 


INR 


 


PTT (Actin FS) 


 


Sodium  128 L


 


Potassium  3.7


 


Chloride  92 L


 


Carbon Dioxide  30.7


 


Anion Gap  9.0


 


BUN  15


 


Creatinine  0.9


 


Est GFR ( Amer)  TNP


 


Est GFR (Non-Af Amer)  TNP


 


BUN/Creatinine Ratio  16.7


 


Glucose  110 H


 


Calcium  9.3


 


Total Bilirubin  0.7


 


AST  13


 


ALT  6 L


 


Alkaline Phosphatase  74


 


Total Protein  6.6


 


Albumin  3.8 L


 


Globulin  2.8


 


Albumin/Globulin Ratio  1.4


 


Urine Color 


 


Urine Clarity 


 


Urine pH 


 


Ur Specific Gravity 


 


Urine Protein 


 


Urine Glucose (UA) 


 


Urine Ketones 


 


Urine Blood 


 


Urine Nitrate 


 


Urine Bilirubin 


 


Urine Urobilinogen 


 


Ur Leukocyte Esterase 








 Laboratory Last Values











WBC  4.7 Th/cmm (4.8-10.8)  L  07/29/19  18:57    


 


RBC  3.98 Mil/cmm (3.80-5.80)   07/29/19  18:57    


 


Hgb  12.6 gm/dL (12-16)   07/29/19  18:57    


 


Hct  37.5 % (41.0-60)  L  07/29/19  18:57    


 


MCV  94.3 fl (80-99)   07/29/19  18:57    


 


MCH  31.7 pg (27.0-31.0)  H  07/29/19  18:57    


 


MCHC Differential  33.6 pg (28.0-36.0)   07/29/19  18:57    


 


RDW  13.9 % (11.5-20.0)   07/29/19  18:57    


 


Plt Count  224 Th/cmm (150-400)   07/29/19  18:57    


 


MPV  5.8 fl  07/29/19  18:57    


 


Neutrophils %  64.1 % (40.0-80.0)   07/29/19  18:57    


 


Lymphocytes %  25.5 % (20.0-50.0)   07/29/19  18:57    


 


Monocytes %  8.8 % (2.0-10.0)   07/29/19  18:57    


 


Eosinophils %  1.4 % (0.0-5.0)   07/29/19  18:57    


 


Basophils %  0.2 % (0.0-2.0)   07/29/19  18:57    


 


PT  10.9 SECONDS (9.5-11.5)   07/29/19  18:57    


 


INR  1.05  (0.5-1.4)   07/29/19  18:57    


 


PTT (Actin FS)  29.4 SECONDS (26.0-38.0)   07/29/19  18:57    


 


Sodium  128 mEq/L (136-145)  L  07/29/19  18:57    


 


Potassium  3.7 mEq/L (3.5-5.1)   07/29/19  18:57    


 


Chloride  92 mEq/L ()  L  07/29/19  18:57    


 


Carbon Dioxide  30.7 mEq/L (21.0-31.0)   07/29/19  18:57    


 


Anion Gap  9.0  (7.0-16.0)   07/29/19  18:57    


 


BUN  15 mg/dL (7-25)   07/29/19  18:57    


 


Creatinine  0.9 mg/dL (0.7-1.3)   07/29/19  18:57    


 


Est GFR ( Amer)  TNP   07/29/19  18:57    


 


Est GFR (Non-Af Amer)  TNP   07/29/19  18:57    


 


BUN/Creatinine Ratio  16.7   07/29/19  18:57    


 


Glucose  110 mg/dL ()  H  07/29/19  18:57    


 


Calcium  9.3 mg/dL (8.6-10.3)   07/29/19  18:57    


 


Total Bilirubin  0.7 mg/dL (0.3-1.0)   07/29/19  18:57    


 


AST  13 U/L (13-39)   07/29/19  18:57    


 


ALT  6 U/L (7-52)  L  07/29/19  18:57    


 


Alkaline Phosphatase  74 U/L ()   07/29/19  18:57    


 


Total Protein  6.6 gm/dL (6.0-8.3)   07/29/19  18:57    


 


Albumin  3.8 gm/dL (4.2-5.5)  L  07/29/19  18:57    


 


Globulin  2.8 gm/dL  07/29/19  18:57    


 


Albumin/Globulin Ratio  1.4  (1.0-1.8)   07/29/19  18:57    


 


Urine Color  YELLOW   07/29/19  13:45    


 


Urine Clarity  CLOUDY  (CLEAR)   07/29/19  13:45    


 


Urine pH  5.5  (4.6 - 8.0)   07/29/19  13:45    


 


Ur Specific Gravity  1.025  (1.005-1.030)   07/29/19  13:45    


 


Urine Protein  NEGATIVE mg/dL (NEGATIVE)   07/29/19  13:45    


 


Urine Glucose (UA)  NEGATIVE mg/dL (NEGATIVE)   07/29/19  13:45    


 


Urine Ketones  NEGATIVE mg/dL (NEGATIVE)   07/29/19  13:45    


 


Urine Blood  TRACE  (NEGATIVE)   07/29/19  13:45    


 


Urine Nitrate  POSITIVE  (NEGATIVE)  H  07/29/19  13:45    


 


Urine Bilirubin  NEGATIVE  (NEGATIVE)   07/29/19  13:45    


 


Urine Urobilinogen  0.2 E.U./dL (0.2 - 1.0)   07/29/19  13:45    


 


Ur Leukocyte Esterase  SMALL  (NEGATIVE)  H  07/29/19  13:45    








 Laboratory Last Values











WBC  4.7 Th/cmm (4.8-10.8)  L  07/29/19  18:57    


 


RBC  3.98 Mil/cmm (3.80-5.80)   07/29/19  18:57    


 


Hgb  12.6 gm/dL (12-16)   07/29/19  18:57    


 


Hct  37.5 % (41.0-60)  L  07/29/19  18:57    


 


MCV  94.3 fl (80-99)   07/29/19  18:57    


 


MCH  31.7 pg (27.0-31.0)  H  07/29/19  18:57    


 


MCHC Differential  33.6 pg (28.0-36.0)   07/29/19  18:57    


 


RDW  13.9 % (11.5-20.0)   07/29/19  18:57    


 


Plt Count  224 Th/cmm (150-400)   07/29/19  18:57    


 


MPV  5.8 fl  07/29/19  18:57    


 


Neutrophils %  64.1 % (40.0-80.0)   07/29/19  18:57    


 


Lymphocytes %  25.5 % (20.0-50.0)   07/29/19  18:57    


 


Monocytes %  8.8 % (2.0-10.0)   07/29/19  18:57    


 


Eosinophils %  1.4 % (0.0-5.0)   07/29/19  18:57    


 


Basophils %  0.2 % (0.0-2.0)   07/29/19  18:57    


 


PT  10.9 SECONDS (9.5-11.5)   07/29/19  18:57    


 


INR  1.05  (0.5-1.4)   07/29/19  18:57    


 


PTT (Actin FS)  29.4 SECONDS (26.0-38.0)   07/29/19  18:57    


 


Sodium  128 mEq/L (136-145)  L  07/29/19  18:57    


 


Potassium  3.7 mEq/L (3.5-5.1)   07/29/19  18:57    


 


Chloride  92 mEq/L ()  L  07/29/19  18:57    


 


Carbon Dioxide  30.7 mEq/L (21.0-31.0)   07/29/19  18:57    


 


Anion Gap  9.0  (7.0-16.0)   07/29/19  18:57    


 


BUN  15 mg/dL (7-25)   07/29/19  18:57    


 


Creatinine  0.9 mg/dL (0.7-1.3)   07/29/19  18:57    


 


Est GFR ( Amer)  TNP   07/29/19  18:57    


 


Est GFR (Non-Af Amer)  TNP   07/29/19  18:57    


 


BUN/Creatinine Ratio  16.7   07/29/19  18:57    


 


Glucose  110 mg/dL ()  H  07/29/19  18:57    


 


Calcium  9.3 mg/dL (8.6-10.3)   07/29/19  18:57    


 


Total Bilirubin  0.7 mg/dL (0.3-1.0)   07/29/19  18:57    


 


AST  13 U/L (13-39)   07/29/19  18:57    


 


ALT  6 U/L (7-52)  L  07/29/19  18:57    


 


Alkaline Phosphatase  74 U/L ()   07/29/19  18:57    


 


Troponin I  0.03 ng/mL (0.01-0.05)   07/29/19  18:57    


 


Total Protein  6.6 gm/dL (6.0-8.3)   07/29/19  18:57    


 


Albumin  3.8 gm/dL (4.2-5.5)  L  07/29/19  18:57    


 


Globulin  2.8 gm/dL  07/29/19  18:57    


 


Albumin/Globulin Ratio  1.4  (1.0-1.8)   07/29/19  18:57    


 


Urine Source  CLEAN C   07/29/19  13:45    


 


Urine Color  YELLOW   07/29/19  13:45    


 


Urine Clarity  CLOUDY  (CLEAR)   07/29/19  13:45    


 


Urine pH  5.5  (4.6 - 8.0)   07/29/19  13:45    


 


Ur Specific Gravity  1.025  (1.005-1.030)   07/29/19  13:45    


 


Urine Protein  NEGATIVE mg/dL (NEGATIVE)   07/29/19  13:45    


 


Urine Glucose (UA)  NEGATIVE mg/dL (NEGATIVE)   07/29/19  13:45    


 


Urine Ketones  NEGATIVE mg/dL (NEGATIVE)   07/29/19  13:45    


 


Urine Blood  TRACE  (NEGATIVE)   07/29/19  13:45    


 


Urine Nitrate  POSITIVE  (NEGATIVE)  H  07/29/19  13:45    


 


Urine Bilirubin  NEGATIVE  (NEGATIVE)   07/29/19  13:45    


 


Urine Urobilinogen  0.2 E.U./dL (0.2 - 1.0)   07/29/19  13:45    


 


Ur Leukocyte Esterase  SMALL  (NEGATIVE)  H  07/29/19  13:45    


 


Urine RBC  2-5 /hpf (0-5)  H  07/29/19  13:45    


 


Urine WBC  6-10 /hpf (0-5)   07/29/19  13:45    


 


Ur Epithelial Cells  FEW /lpf (FEW)   07/29/19  13:45    


 


Urine Bacteria  4+ /hpf (NONE SEEN)  H  07/29/19  13:45    








Pending lab result: urine culture.





- Radiology Results


Results: 





CXR (1v.): Based on my interpretation, cardiomegaly; otherwise, NAD. Official 

report is pending.





- EKG Interpretations


EKG Time:: 18:59


Rate & Rhythm: Atrial fibrillation with VR 56


Comments:: 





No acute ischemic changes.





ED Septic Shock





- .


Is Septic Shock (SBP<90, OR Lactate>4 mmol\L) present?: No





ED Reassessment (Disposition)





- Reassessment


Reassessment:: 





1955 Pt remains stable and comfortable. Repeat body temperature is 98.8F. Pt is 

medically cleared to be admitted to Geropsych Unit.


Reassessment Condition:: Improved





- Diagnosis


Diagnosis:: 





H/O schizoaffective disorder and dementia with increased agitation.


UTI.


Mild hyponatremia.


Mild hyperglycemia.


Chronic atrial fibrillation.





- Patient Disposition


Admitted to:: [facility]


Admitting Medical Physician:: Jennifer Travis


Admitting Psych Physician:: Glen Cole


Time:: 20:00


Condition at Disposition:: Stable

## 2019-07-30 LAB
CHOLEST SERPL-MCNC: 120 MG/DL (ref ?–200)
HDLC SERPL-MCNC: 55 MG/DL (ref 23–92)
TRIGL SERPL-MCNC: 54 MG/DL (ref ?–150)

## 2019-07-30 NOTE — HISTORY & PHYSICAL
ADMIT DATE:  07/29/2019



CHIEF COMPLAINT:  Increased agitation.



HISTORY OF PRESENT ILLNESS:  The patient is an 89-year-old male with a past

medical history of psychosis, muscle weakness, difficulty walking, Alzheimer

dementia, essential hypertension, iron deficiency anemia, chronic pain syndrome,

schizophrenia, vitamin deficiency, hyperosmolarity and hyponatremia in the past,

atrial fibrillation, GERD without esophagitis, anorexia, iron deficiency, was

brought in from nursing facility for increased agitation.  The patient is ____

and wanted to see his doctor, Dr. Cole.  Otherwise, no fever, no chills,

currently stable, lying in the bed.



PAST MEDICAL HISTORY:  As mentioned above unspecified psychosis, generalized

muscle weakness, difficulty walking, lack of coordination, Alzheimer's dementia,

essential hypertension, iron deficiency anemia, chronic pain syndrome,

schizoaffective disorder, vitamin D deficiency, hyperosmolarity and

hyponatremia, atrial fibrillation, GERD without esophagitis, anorexia, iron

deficiency.



ALLERGIES:  NKDA.



MEDICATIONS:  See medication reconciliation sheet.



FAMILY HISTORY:  Unknown.



SOCIAL HISTORY:  Lives at nursing facility.  Denies history of smoking, alcohol

or drug use.



PHYSICAL EXAMINATION:

VITAL SIGNS:  Current vital signs shows temperature is 97.7 degrees Fahrenheit,

pulse is 63, respirations 20, blood pressure 144/88.

GENERAL:  The patient is comfortable lying in bed, in no acute distress.

HEENT:  Head is normocephalic, atraumatic.  Oral cavity is moist, pink tongue.

EYES:  No pallor, no icterus.  PERRLA, EOMI.

NECK:  Supple.  No JVD.  No carotid bruit.  Trachea in midline.

CHEST:  Bilateral vesicular sounds.  No crackles or wheezing.

HEART:  S1, S2 within normal limits.  Regular rhythm.  No murmur.  No gallop.

ABDOMEN:  Soft, nontender, and nondistended.  Bowel sounds are present.

EXTREMITIES:  No cyanosis, no clubbing, no edema.

CENTRAL NERVOUS SYSTEM:  Alert, awake, and oriented x 3.  No focal deficits.



LABORATORY DATA:  Current lab shows WBC count 4700, hemoglobin 12.6, hematocrit

37.5, platelets are 224,000, neutrophils 64%.  Sodium 128, potassium 3.7,

chloride 92, bicarbonate is 20.5, BUN is 15, creatinine 0.9, glucose 110. 

Urinalysis was clear, cloudy urine, positive nitrite, small leukocyte esterase,

wbc's 6-10 and 4+ bacteria.



IMPRESSION:

1.  Urinary tract infection.

2.  Hypertension.

3.  Dementia.

4.  Aggressive behavior.

5.  Schizophrenia.

6.  History of psychosis.



RECOMMENDATIONS:  Start Levaquin for UTI and we will continue same treatment. 

Further care per Dr. Cole, psychiatric consultant.





DD: 07/30/2019 13:33

DT: 07/30/2019 13:50

JOB# [id number]  [id number]

## 2019-07-30 NOTE — PSYCHIATRIC EVALUATION
DATE OF SERVICE:  07/29/2019



INITIAL EVALUATION AND MENTAL STATUS EXAM



AGE:  89.



SEX:  Male.



PHYSICIAN:  Dr. Cole.



CHIEF COMPLAINT:  Agitation and striking out behavior.



HISTORY OF PRESENT ILLNESS:  The patient is an 89-year-old male who was

transferred from [facility] because of increased agitation and

irritability.  The patient also has been striking out in the facility and has

been having difficulty following directions.  Also, has been refusing to take

any medications and refusing care.



PAST PSYCHIATRIC HISTORY:  The patient has history of psychosis as well as

history of dementia.



PAST MEDICAL HISTORY:  The patient has hypertension, anemia, gastroesophageal

reflux disease, atrial fib and iron deficiency.  The patient also was recently

diagnosed with urinary tract infection and is on Bactrim.



SOCIAL HISTORY:  The patient lives in [facility].  No known alcohol or

drug use.



ALLERGIES:  No known allergies.



MENTAL STATUS EXAMINATION:  The patient appears his stated age.  Anxious. 

Cooperative.  Thought processes are mainly goal directed.  The patient denies

any auditory or visual hallucinations or delusions.  The patient denies any

suicidal or homicidal ideations.  The patient is alert, but disoriented to

place, person and situation, and he thinks that he is in [facility] and is at

least 89 years old and has twins that are 30 years old.  The patient is

disoriented to person and place and date.  Impaired immediate and recent memory,

but intact remote memory and he did remember his birth date.  Poor insight and

poor judgment.  Seems to be of average intelligence based on his verbal ability.



ASSESSMENT:

PRIMARY DIAGNOSIS:  Unspecified psychosis.



SECONDARY DIAGNOSIS:  Dementia, moderate to severe, with psychotic features and

behavioral disturbances.



TREATMENT PLAN:  Continue to monitor behavior and condition closely.  We will

start individual as well as milieu psychotherapy.  Also, we will continue

Seroquel 25 mg at bedtime, Namenda 10 mg at bedtime and in the morning as well

as Aricept 10 mg at bedtime.



ESTIMATED LENGTH OF STAY:  5-7 days.



PATIENT'S STRENGTHS AND WEAKNESSES:  The patient seems to be in a relatively

fair health and cooperative with treatment.  Weaknesses are his confusion and

ineffective coping.



AFTER DISCHARGE PLAN:  Outpatient treatment and followup will continue as an

outpatient  and the patient will return to [facility].





DD: 07/30/2019 19:06

DT: 07/30/2019 23:42

[facility]# [id number]  [id number]

## 2019-07-30 NOTE — DIAGNOSTIC IMAGING REPORT
CHEST X-RAY: AP view



INDICATION: pain



COMPARISON: None



FINDINGS: Chronic lung changes are seen with increased right upper lung

zone lung markings.  No focal consolidation or effusions.  Mild

cardiomegaly noted with atherosclerosis.  Degenerative changes spine are

noted.*Gas-filled loops of bowel of the upper abdomen are noted.



IMPRESSION:



Chronic lung changes increased right upper lung zone markings which a be

chronic, however, faint infiltrate cannot be excluded, please correlate

clinically.



Mild Cardiomegaly and atherosclerotic vascular disease.

## 2019-07-31 LAB — HBA1C BLD-MCNC: 5.3 % (ref 4.8–5.6)

## 2019-08-01 NOTE — PROGRESS NOTES
DATE:  



SUBJECTIVE:  Chart was reviewed and the patient interviewed.  Also discussed the

patient's condition with the staff and reviewed records and labs.  The patient

remains in a depressed mood.  The patient also is withdrawn and is isolative. 

The patient also is forgetful and he still needs lots of redirections.  On the

other hand, the patient seems less agitated and less irritable and is compliant

with taking his medications with no side effects.



ASSESSMENT:  The patient is still agitated.



TREATMENT PLAN:  Continue to monitor behavior and continue to work on his

agitation and also working on his behavioral modification.





DD: 07/31/2019 18:17

DT: 07/31/2019 20:53

JOB# [id number]  [id number]

## 2019-08-02 NOTE — PROGRESS NOTES
DATE:  



SUBJECTIVE:  Chart reviewed and the patient interviewed.  Also discussed the

patient's condition with the staff and reviewed records and labs.  The patient

continued to be in a depressed mood and sad affect.  The patient also is still

depressed and withdrawn.  His interaction with others is minimum.  The patient

also is still easily irritable and easily agitated.  He wants to be left alone. 

Otherwise, the patient is compliant with taking his medications with no side

effects of medications.



ASSESSMENT:  The patient is still depressed.



TREATMENT PLAN:  Continue to monitor behavior and condition closely.  Also,

continue adjusting psychotropic medications and working on his ineffective

coping.





DD: 08/02/2019 19:08

DT: 08/02/2019 22:23

[facility]# [id number]  [id number]

## 2019-08-02 NOTE — PROGRESS NOTES
DATE:  08/01/2019



SUBJECTIVE:  Chart was reviewed and the patient interviewed.  Also discussed the

patient's condition with the staff and reviewed records and labs.  "I want to

go home."  The patient is still depressed and confused.  The patient also still

has crying spells at times.  The patient also has poor appetite.  He also tends

to isolate himself and interacting minimally with others.  The patient also

still wants to be left alone.  Otherwise, the patient is compliant with taking

his medications with no side effects of medications.



ASSESSMENT:  The patient is still severely depressed.



TREATMENT PLAN:  Continue to monitor his behavior and hi condition closely. 

Also, continue adjusting psychotropic medications and working on his severe

level of depression.





DD: 08/02/2019 18:05

DT: 08/02/2019 22:04

[facility]# [id number]  [id number]

## 2019-08-03 NOTE — PROGRESS NOTES
DATE:  08/03/2019



SUBJECTIVE:  The patient was seen in his room.  The patient is awake, alert,

episodes of confusion, agitation and behavioral outbursts.  Otherwise, the

patient appears to be in no acute distress.



OBJECTIVE:

VITAL SIGNS:  Temperature 97.6, heart rate 67, blood pressure 106/73,

respirations 18, and 96% on room air.

HEENT:  Head is atraumatic and normocephalic.  Eyes:  Bilateral conjunctivae are

clear.  Bilateral pupils are equally round and reactive.

NECK:  Supple.  No JVD.

CARDIOVASCULAR:  S1, S2, without murmur.

PULMONARY:  Clear to auscultation.

GASTROINTESTINAL:  Soft and nontender without guarding.  Positive bowel sounds.

MUSCULOSKELETAL:  No clubbing.  No cyanosis noted.



ASSESSMENT:

1.  Dementia.

2.  Psychosis.

3.  Hypertension.

4.  Osteoarthritis.

5.  Gastroesophageal reflux disease.



PLAN:  We will keep the patient to Inpatient Psychiatric Unit.  We will follow

up with a psychiatrist to monitor the patient's condition and behavior. 

Treatment plans were discussed with the patient's nurse.  Treatment plans were

discussed with Dr. Travis.





DD: 08/03/2019 08:39

DT: 08/03/2019 11:36

JOB# [id number]  [id number]

## 2019-08-04 NOTE — PROGRESS NOTES
DATE:  



SUBJECTIVE:  Chart was reviewed and the patient interviewed and discussed the

patient's condition with the staff and reviewed records and labs.  The patient

is still anxious and is still in irritable mood.  The patient also is still

confused.  The patient also is focused on "going home," but he is still unable

to provide with any safe plan for self-care.



ASSESSMENT:  The patient is still confused and agitated.



TREATMENT PLAN:  Continue to monitor behavior and condition closely.  Also,

continue adjusting psychotropic medications and work on behavioral modification.





DD: 08/04/2019 11:45

DT: 08/04/2019 20:10

[facility]# [id number]  [id number]

## 2019-08-04 NOTE — INTERNAL MEDICINE PROG NOTE
Internal Medicine Subjective





- Subjective


Patient seen and examined:: with staff


Patient is:: awake, verbal, agitated


Per staff patient has:: no adverse event, no episodes of fall





Internal Medicine Objective





- Results


Result Diagrams: 


 19 18:57





 19 18:57


Recent Labs: 


 Laboratory Last Values











WBC  4.7 Th/cmm (4.8-10.8)  L  19  18:57    


 


RBC  3.98 Mil/cmm (3.80-5.80)   19  18:57    


 


Hgb  12.6 gm/dL (12-16)   19  18:57    


 


Hct  37.5 % (41.0-60)  L  19  18:57    


 


MCV  94.3 fl (80-99)   19  18:57    


 


MCH  31.7 pg (27.0-31.0)  H  19  18:57    


 


MCHC Differential  33.6 pg (28.0-36.0)   19  18:57    


 


RDW  13.9 % (11.5-20.0)   19  18:57    


 


Plt Count  224 Th/cmm (150-400)   19  18:57    


 


MPV  5.8 fl  19  18:57    


 


Neutrophils %  64.1 % (40.0-80.0)   19  18:57    


 


Lymphocytes %  25.5 % (20.0-50.0)   19  18:57    


 


Monocytes %  8.8 % (2.0-10.0)   19  18:57    


 


Eosinophils %  1.4 % (0.0-5.0)   19  18:57    


 


Basophils %  0.2 % (0.0-2.0)   19  18:57    


 


PT  10.9 SECONDS (9.5-11.5)   19  18:57    


 


INR  1.05  (0.5-1.4)   19  18:57    


 


PTT (Actin FS)  29.4 SECONDS (26.0-38.0)   19  18:57    


 


Sodium  128 mEq/L (136-145)  L  19  18:57    


 


Potassium  3.7 mEq/L (3.5-5.1)   19  18:57    


 


Chloride  92 mEq/L ()  L  19  18:57    


 


Carbon Dioxide  30.7 mEq/L (21.0-31.0)   19  18:57    


 


Anion Gap  9.0  (7.0-16.0)   19  18:57    


 


BUN  15 mg/dL (7-25)   19  18:57    


 


Creatinine  0.9 mg/dL (0.7-1.3)   19  18:57    


 


Est GFR ( Amer)  TNP   19  18:57    


 


Est GFR (Non-Af Amer)  TNP   19  18:57    


 


BUN/Creatinine Ratio  16.7   19  18:57    


 


Glucose  110 mg/dL ()  H  19  18:57    


 


Calcium  9.3 mg/dL (8.6-10.3)   19  18:57    


 


Total Bilirubin  0.7 mg/dL (0.3-1.0)   19  18:57    


 


AST  13 U/L (13-39)   19  18:57    


 


ALT  6 U/L (7-52)  L  19  18:57    


 


Alkaline Phosphatase  74 U/L ()   19  18:57    


 


Troponin I  0.03 ng/mL (0.01-0.05)   19  18:57    


 


Total Protein  6.6 gm/dL (6.0-8.3)   19  18:57    


 


Albumin  3.8 gm/dL (4.2-5.5)  L  19  18:57    


 


Globulin  2.8 gm/dL  19  18:57    


 


Albumin/Globulin Ratio  1.4  (1.0-1.8)   19  18:57    


 


Triglycerides  54 mg/dL (<150)   19  18:57    


 


Cholesterol  120 mg/dL (<200)   19  18:57    


 


LDL Cholesterol Direct  63 mg/dL ()  L  19  18:57    


 


HDL Cholesterol  55 mg/dL (23-92)   19  18:57    


 


Urine Source  CLEAN C   19  13:45    


 


Urine Color  YELLOW   19  13:45    


 


Urine Clarity  CLOUDY  (CLEAR)   19  13:45    


 


Urine pH  5.5  (4.6 - 8.0)   19  13:45    


 


Ur Specific Gravity  1.025  (1.005-1.030)   19  13:45    


 


Urine Protein  NEGATIVE mg/dL (NEGATIVE)   19  13:45    


 


Urine Glucose (UA)  NEGATIVE mg/dL (NEGATIVE)   19  13:45    


 


Urine Ketones  NEGATIVE mg/dL (NEGATIVE)   19  13:45    


 


Urine Blood  TRACE  (NEGATIVE)   19  13:45    


 


Urine Nitrate  POSITIVE  (NEGATIVE)  H  19  13:45    


 


Urine Bilirubin  NEGATIVE  (NEGATIVE)   19  13:45    


 


Urine Urobilinogen  0.2 E.U./dL (0.2 - 1.0)   19  13:45    


 


Ur Leukocyte Esterase  SMALL  (NEGATIVE)  H  19  13:45    


 


Urine RBC  2-5 /hpf (0-5)  H  19  13:45    


 


Urine WBC  6-10 /hpf (0-5)   19  13:45    


 


Ur Epithelial Cells  FEW /lpf (FEW)   19  13:45    


 


Urine Bacteria  4+ /hpf (NONE SEEN)  H  19  13:45    














- Physical Exam


Vitals and I&O: 


 Vital Signs











Temp  97.0 F   19 06:06


 


Pulse  61   19 06:06


 


Resp  18   19 06:06


 


BP  126/71   19 06:06


 


Pulse Ox  93   19 06:06








 Intake & Output











 19





 18:59 06:59 18:59


 


Intake Total 1350 240 


 


Balance 1350 240 


 


Intake:   


 


  Oral 1350 240 


 


Other:   


 


  # Voids  2 


 


  # Bowel Movements  0 











Active Medications: 


Current Medications





Acetaminophen (Tylenol)  650 mg PO Q6H PRN


   PRN Reason: MODERATE PAIN


   Stop: 19 21:48


Acetaminophen (Tylenol Extra Strength)  500 mg PO Q4HR PRN


   PRN Reason: MILD PAIN


   Stop: 19 21:48


Acetaminophen (Tylenol Extra Strength)  1,000 mg PO Q8H PRN


   PRN Reason: Severe Pain


   Stop: 19 21:48


Al Hydrox/Mg Hydrox/Simethicone (Maalox)  30 ml PO Q4HR PRN


   PRN Reason: GI DISTRESS


   Stop: 19 21:36


Aspirin (Ecotrin)  81 mg PO DAILY [facility]


   Stop: 19 08:59


   Last Admin: 19 09:16 Dose:  81 mg


Docusate Sodium (Colace)  250 mg PO DAILY [facility]


   Stop: 19 08:59


   Last Admin: 19 09:17 Dose:  250 mg


Donepezil HCl (Aricept)  10 mg PO HS [facility]


   Stop: 19 20:59


   Last Admin: 19 21:08 Dose:  10 mg


Famotidine (Pepcid)  40 mg PO DAILY [facility]


   Stop: 19 08:59


   Last Admin: 19 09:17 Dose:  40 mg


Hydrochlorothiazide (Hctz)  25 mg PO DAILY [facility]


   Stop: 19 08:59


   Last Admin: 19 09:09 Dose:  Not Given


Levofloxacin (Levaquin)  250 mg PO DAILY [facility]


   Stop: 19 13:44


   Last Admin: 19 09:17 Dose:  250 mg


Lorazepam (Ativan)  0.5 mg PO Q4HR PRN; Protocol


   PRN Reason: Anxiety


   Stop: 19 21:36


   Last Admin: 19 17:51 Dose:  0.5 mg


Magnesium Hydroxide (Milk Of Magnesia)  30 ml PO HS PRN


   PRN Reason: Constipation


Memantine (Namenda)  10 mg PO BID [facility]


   Stop: 19 08:59


   Last Admin: 19 09:17 Dose:  10 mg


Multivitamins/Vitamin C (Theragran)  1 tab PO DAILY [facility]


   Stop: 19 08:59


   Last Admin: 19 09:17 Dose:  1 tab


Quetiapine Fumarate (Seroquel)  25 mg PO HS [facility]; Protocol


   Stop: 19 20:59


   Last Admin: 19 21:08 Dose:  25 mg


Zolpidem Tartrate (Ambien)  5 mg PO HS PRN


   PRN Reason: Insomnia


   Stop: 19 21:36


   Last Admin: 19 21:08 Dose:  5 mg








General: weak, demented, NAD


HEENT: NC/AT, PERRLA


Neck: Supple, No JVD


Lungs: CTAB


Cardiovascular: RRR, Normal S1, Normal S2


Abdomen: soft, non-tender, non-distended





Internal Medicine Assmt/Plan





- Assessment


Assessment: 





Dementia


Psychosis


HTN


OA


GERD





- Plan


Plan: 





Continue current treatment plan.


Monitor Labs.Continue current medications


Continue to monitor VS


Monitor Diet/Nutritional support.


Psych management per Psychiatry.


Pain Management.


PT/OT prn


Safety precaution, Fall precaution, frequent nursing round.


Supportive care. 


Continue collaborating with consulting specialists, case management and nursing 

team. 





Nutritional Asmnt/Malnutr-PDOC





- Dietary Evaluation


Malnutrition Findings (Please click <Entered> for more info): 








Nutritional Asmnt/Malnutrition                             Start:  19 15:

13


Text:                                                      Status: Complete    

  


Freq:                                                                          

  


Protocol:                                                                      

  


 Document     19 15:13  CHULA  (Rec: 19 15:18  CHULA [email or web address])


 Nutritional Asmnt/Malnutrition


     Patient General Information


      Nutritional Screening                      Moderate Risk


      Diagnosis                                  Psychosis NOS


      Pertinent Medical Hx/Surgical Hx           HTN, PUD/GERD, Dementia,


                                                 Alzheimers disease, Chronic


                                                 anemia, Chronic atrial


                                                 fibrillation, schizophrenia,


                                                 vitamin D deficiency,


                                                 hyperosmolarity, hyponatremia,


                                                 anorexia, iron deficiency


      Subjective Information                     Pt downgraded from moderate


                                                 risk to low risk d/t meal/diet


                                                 order tolerance and meeting


                                                 nutrient needs.


                                                 Pt is a 89-year-old male from


                                                 nursing facility admitted on  c/o increased agitation.


                                                 Per Meal/Nutrition Activity


                                                 Record, Pt PO intake 60% meals


                                                 x 2 days.


                                                 HT: 511


                                                 WT: 180 LB (81.82 kg)


                                                 ADJ WT: 77.9 kg


                                                 BMI: 25.10 (Overweight)


                                                 GI: WNL, Soft, Flat


                                                 BM: /1 x1


                                                 I/O: 940/Not Noted


                                                 Skin: WNL, Intact


                                                 William: 15


                                                 Diet Order: Mechanical Soft


                                                 Estimated Energy Needs: (


                                                 Overweight, ADJ BW)


                                                 8033-0934 kcals (20-25 kcals/


                                                 kg)


                                                 62-70 g Pro (0.8-0.9 g/kg)


                                                 4467-3485 ml (25-30 ml/kg)


                                                 Pt is eating 60% of meals Per


                                                 Meal/Nutrition Activity Record


                                                 . Dietary is currently


                                                 providing an estimated 2364


                                                 kcals and 117 gm Pro, per Pt


                                                 PO intake this is providing an


                                                 estimated 1418 kcals and 70


                                                 gm Pro to meet 90% kcal and


                                                 100+% Pro needs- adequate.


      Current Diet Order/ Nutrition Support      Mechanical Soft


      Pertinent Medications                      Maalox (PRN), Colace, Pepcid,


                                                 MOM (PRN), Theragran


      Pertinent Labs                             : Hgb/Hct 12.6/37.5, Na


                                                 128, Cl 92, Glucose 110, Alb 3


                                                 .8


     Nutritional Hx/Data


      Height                                     5 ft 11 in


      Height (Calculated Centimeters)            180.3


      Current Weight (lbs)                       180 lb


      Weight (Calculated Kilograms)              81.6


      Weight (Calculated Grams)                  87055.6


      Ideal Body Weight                          75.3 kg


      % Ideal Body Weight                        108


      Body Mass Index (BMI)                      25.1


      Weight Status                              Overweight


     GI Symptoms


      GI Symptoms                                None


      Last BM                                    8/1 x1


      Skin Integrity/Comment:                    WNL, Intact


                                                 William: 15


      Current %PO                                Fair (50-74%)


     Estimated Nutritional Goals


      BEE in Kcals:                              Adj wt of IBW


      Calories/Kcals/Kg                          20-25


      Kcals Calculated                           6561-0410


      Protein:                                   Adj wt of IBW


      Protein g/k.8-0.9


      Protein Calculated                         62-70


      Fluid: ml                                  6789-8428 ml (25-30 ml/kg)


     Nutritional Problem


      No current Nutrition Prob


       Problem                                   N/A


       Etiology                                  N/A


       Signs/Symptoms:                           N/A


     Malnutrition Related to Morbid Obesity


      Malnutrition related to morbid obesity     No


     Intervention/Recommendation


      Comments                                   1.Continue with mechanical


                                                 soft diet as ordered.


     Expected Outcomes/Goals


      Expected Outcomes/Goals                    1.PO intake to continue to


                                                 meet 75% of nutritional needs.


                                                 2.Monitor PO intake, wt,


                                                 nutrition related labs, and


                                                 skin integrity.


                                                 3.F/U as low risk in 7 days,

## 2019-08-05 NOTE — PROGRESS NOTES
DATE:  



SUBJECTIVE:  Chart reviewed and the patient interviewed.  Also discussed the

patient's condition with the staff and reviewed records and labs.  "I am not

sick."  The patient is still anxious and in irritable mood.  The patient also

wants to be left alone and stays by himself most of the time, but at the same

time the patient is trying to interact slightly more.  The patient also has

episodes of anger and irritability, but minimal interaction with others and

decreased behavioral problems.  The patient also has been compliant with taking

his medications with no side effects of medications.



MENTAL STATUS EXAMINATION:  The patient is restless and anxious and in irritable

mood.  Cooperative, but guarded.



TREATMENT PLAN:  We will continue to monitor his behavior and his condition

closely.  Also, encouraged the patient to get out of his room to end of his

isolation more.  At the same time, we will continue Seroquel 25 mg at bedtime

and we will continue Aricept and Namenda and we will continue to follow up

closely.





DD: 08/05/2019 07:02

DT: 08/05/2019 08:05

[facility]# [id number]  [id number]

## 2019-08-06 NOTE — INTERNAL MEDICINE PROG NOTE
Internal Medicine Subjective





- Subjective


Service Date: 19


Patient is:: awake, verbal, agitated


Per staff patient has:: no adverse event, no episodes of fall





Internal Medicine Objective





- Results


Result Diagrams: 


 19 18:57





 19 18:57


Recent Labs: 


 Laboratory Last Values











WBC  4.7 Th/cmm (4.8-10.8)  L  19  18:57    


 


RBC  3.98 Mil/cmm (3.80-5.80)   19  18:57    


 


Hgb  12.6 gm/dL (12-16)   19  18:57    


 


Hct  37.5 % (41.0-60)  L  19  18:57    


 


MCV  94.3 fl (80-99)   19  18:57    


 


MCH  31.7 pg (27.0-31.0)  H  19  18:57    


 


MCHC Differential  33.6 pg (28.0-36.0)   19  18:57    


 


RDW  13.9 % (11.5-20.0)   19  18:57    


 


Plt Count  224 Th/cmm (150-400)   19  18:57    


 


MPV  5.8 fl  19  18:57    


 


Neutrophils %  64.1 % (40.0-80.0)   19  18:57    


 


Lymphocytes %  25.5 % (20.0-50.0)   19  18:57    


 


Monocytes %  8.8 % (2.0-10.0)   19  18:57    


 


Eosinophils %  1.4 % (0.0-5.0)   19  18:57    


 


Basophils %  0.2 % (0.0-2.0)   19  18:57    


 


PT  10.9 SECONDS (9.5-11.5)   19  18:57    


 


INR  1.05  (0.5-1.4)   19  18:57    


 


PTT (Actin FS)  29.4 SECONDS (26.0-38.0)   19  18:57    


 


Sodium  128 mEq/L (136-145)  L  19  18:57    


 


Potassium  3.7 mEq/L (3.5-5.1)   19  18:57    


 


Chloride  92 mEq/L ()  L  19  18:57    


 


Carbon Dioxide  30.7 mEq/L (21.0-31.0)   19  18:57    


 


Anion Gap  9.0  (7.0-16.0)   19  18:57    


 


BUN  15 mg/dL (7-25)   19  18:57    


 


Creatinine  0.9 mg/dL (0.7-1.3)   19  18:57    


 


Est GFR ( Amer)  TNP   19  18:57    


 


Est GFR (Non-Af Amer)  TNP   19  18:57    


 


BUN/Creatinine Ratio  16.7   19  18:57    


 


Glucose  110 mg/dL ()  H  19  18:57    


 


Calcium  9.3 mg/dL (8.6-10.3)   19  18:57    


 


Total Bilirubin  0.7 mg/dL (0.3-1.0)   19  18:57    


 


AST  13 U/L (13-39)   19  18:57    


 


ALT  6 U/L (7-52)  L  19  18:57    


 


Alkaline Phosphatase  74 U/L ()   19  18:57    


 


Troponin I  0.03 ng/mL (0.01-0.05)   19  18:57    


 


Total Protein  6.6 gm/dL (6.0-8.3)   19  18:57    


 


Albumin  3.8 gm/dL (4.2-5.5)  L  19  18:57    


 


Globulin  2.8 gm/dL  19  18:57    


 


Albumin/Globulin Ratio  1.4  (1.0-1.8)   19  18:57    


 


Triglycerides  54 mg/dL (<150)   19  18:57    


 


Cholesterol  120 mg/dL (<200)   19  18:57    


 


LDL Cholesterol Direct  63 mg/dL ()  L  19  18:57    


 


HDL Cholesterol  55 mg/dL (23-92)   19  18:57    


 


Urine Source  CLEAN C   19  13:45    


 


Urine Color  YELLOW   19  13:45    


 


Urine Clarity  CLOUDY  (CLEAR)   19  13:45    


 


Urine pH  5.5  (4.6 - 8.0)   19  13:45    


 


Ur Specific Gravity  1.025  (1.005-1.030)   19  13:45    


 


Urine Protein  NEGATIVE mg/dL (NEGATIVE)   19  13:45    


 


Urine Glucose (UA)  NEGATIVE mg/dL (NEGATIVE)   19  13:45    


 


Urine Ketones  NEGATIVE mg/dL (NEGATIVE)   19  13:45    


 


Urine Blood  TRACE  (NEGATIVE)   19  13:45    


 


Urine Nitrate  POSITIVE  (NEGATIVE)  H  19  13:45    


 


Urine Bilirubin  NEGATIVE  (NEGATIVE)   19  13:45    


 


Urine Urobilinogen  0.2 E.U./dL (0.2 - 1.0)   19  13:45    


 


Ur Leukocyte Esterase  SMALL  (NEGATIVE)  H  19  13:45    


 


Urine RBC  2-5 /hpf (0-5)  H  19  13:45    


 


Urine WBC  6-10 /hpf (0-5)   19  13:45    


 


Ur Epithelial Cells  FEW /lpf (FEW)   19  13:45    


 


Urine Bacteria  4+ /hpf (NONE SEEN)  H  19  13:45    














- Physical Exam


Vitals and I&O: 


 Vital Signs











Temp  97.8 F   19 05:20


 


Pulse  57   19 05:20


 


Resp  18   19 05:20


 


BP  118/74   19 09:20


 


Pulse Ox  97   19 05:20








 Intake & Output











 19





 18:59 06:59 18:59


 


Intake Total 800 240 


 


Balance 800 240 


 


Intake:   


 


  Oral 800 240 


 


Other:   


 


  # Voids 4 2 


 


  # Bowel Movements 1  











Active Medications: 


Current Medications





Acetaminophen (Tylenol)  650 mg PO Q6H PRN


   PRN Reason: MODERATE PAIN


   Stop: 19 21:48


Acetaminophen (Tylenol Extra Strength)  500 mg PO Q4HR PRN


   PRN Reason: MILD PAIN


   Stop: 19 21:48


Acetaminophen (Tylenol Extra Strength)  1,000 mg PO Q8H PRN


   PRN Reason: Severe Pain


   Stop: 19 21:48


Al Hydrox/Mg Hydrox/Simethicone (Maalox)  30 ml PO Q4HR PRN


   PRN Reason: GI DISTRESS


   Stop: 19 21:36


Aspirin (Ecotrin)  81 mg PO DAILY [facility]


   Stop: 19 08:59


   Last Admin: 19 09:20 Dose:  81 mg


Docusate Sodium (Colace)  250 mg PO DAILY [facility]


   Stop: 19 08:59


   Last Admin: 19 09:21 Dose:  Not Given


Donepezil HCl (Aricept)  10 mg PO HS [facility]


   Stop: 19 20:59


   Last Admin: 19 20:41 Dose:  10 mg


Famotidine (Pepcid)  40 mg PO DAILY [facility]


   Stop: 19 08:59


   Last Admin: 19 09:19 Dose:  40 mg


Hydrochlorothiazide (Hctz)  25 mg PO DAILY [facility]


   Stop: 19 08:59


   Last Admin: 19 09:20 Dose:  25 mg


Levofloxacin (Levaquin)  250 mg PO DAILY [facility]


   Stop: 19 13:44


   Last Admin: 19 09:20 Dose:  250 mg


Lorazepam (Ativan)  0.5 mg PO Q4HR PRN; Protocol


   PRN Reason: Anxiety


   Stop: 19 21:36


   Last Admin: 19 23:30 Dose:  0.5 mg


Magnesium Hydroxide (Milk Of Magnesia)  30 ml PO HS PRN


   PRN Reason: Constipation


Memantine (Namenda)  10 mg PO BID [facility]


   Stop: 19 08:59


   Last Admin: 19 09:20 Dose:  10 mg


Multivitamins/Vitamin C (Theragran)  1 tab PO DAILY [facility]


   Stop: 19 08:59


   Last Admin: 19 09:21 Dose:  1 tab


Quetiapine Fumarate (Seroquel)  25 mg PO HS [facility]; Protocol


   Stop: 19 20:59


   Last Admin: 19 20:41 Dose:  25 mg


Zolpidem Tartrate (Ambien)  5 mg PO HS PRN


   PRN Reason: Insomnia


   Stop: 19 21:36


   Last Admin: 19 20:59 Dose:  5 mg








General: weak, demented, NAD


HEENT: NC/AT, PERRLA


Neck: Supple, No JVD


Lungs: CTAB


Cardiovascular: RRR, Normal S1, Normal S2


Abdomen: soft, non-tender, non-distended





Internal Medicine Assmt/Plan





- Assessment


Assessment: 





Dementia


Psychosis


HTN


OA


GERD











- Plan


Plan: 








Continue current treatment plan.


Monitor Labs.Continue current medications


Continue to monitor VS


Monitor Diet/Nutritional support.


Psych management per Psychiatry.


Pain Management.


PT/OT prn


Safety precaution, Fall precaution, frequent nursing round.


Supportive care. 


Continue collaborating with consulting specialists, case management and nursing 

team





Nutritional Asmnt/Malnutr-PDOC





- Dietary Evaluation


Malnutrition Findings (Please click <Entered> for more info): 








Nutritional Asmnt/Malnutrition                             Start:  19 15:

13


Text:                                                      Status: Complete    

  


Freq:                                                                          

  


Protocol:                                                                      

  


 Document     19 15:13  CHULA  (Rec: 19 15:18  CHULA [email or web address])


 Nutritional Asmnt/Malnutrition


     Patient General Information


      Nutritional Screening                      Moderate Risk


      Diagnosis                                  Psychosis NOS


      Pertinent Medical Hx/Surgical Hx           HTN, PUD/GERD, Dementia,


                                                 Alzheimers disease, Chronic


                                                 anemia, Chronic atrial


                                                 fibrillation, schizophrenia,


                                                 vitamin D deficiency,


                                                 hyperosmolarity, hyponatremia,


                                                 anorexia, iron deficiency


      Subjective Information                     Pt downgraded from moderate


                                                 risk to low risk d/t meal/diet


                                                 order tolerance and meeting


                                                 nutrient needs.


                                                 Pt is a 89-year-old male from


                                                 nursing facility admitted on  c/o increased agitation.


                                                 Per Meal/Nutrition Activity


                                                 Record, Pt PO intake 60% meals


                                                 x 2 days.


                                                 HT: 511


                                                 WT: 180 LB (81.82 kg)


                                                 ADJ WT: 77.9 kg


                                                 BMI: 25.10 (Overweight)


                                                 GI: WNL, Soft, Flat


                                                 BM: 8/1 x1


                                                 I/O: 940/Not Noted


                                                 Skin: WNL, Intact


                                                 William: 15


                                                 Diet Order: Mechanical Soft


                                                 Estimated Energy Needs: (


                                                 Overweight, ADJ BW)


                                                 6926-6499 kcals (20-25 kcals/


                                                 kg)


                                                 62-70 g Pro (0.8-0.9 g/kg)


                                                 1189-8945 ml (25-30 ml/kg)


                                                 Pt is eating 60% of meals Per


                                                 Meal/Nutrition Activity Record


                                                 . Dietary is currently


                                                 providing an estimated 2364


                                                 kcals and 117 gm Pro, per Pt


                                                 PO intake this is providing an


                                                 estimated 1418 kcals and 70


                                                 gm Pro to meet 90% kcal and


                                                 100+% Pro needs- adequate.


      Current Diet Order/ Nutrition Support      Mechanical Soft


      Pertinent Medications                      Maalox (PRN), Colace, Pepcid,


                                                 MOM (PRN), Theragran


      Pertinent Labs                             : Hgb/Hct 12.6/37.5, Na


                                                 128, Cl 92, Glucose 110, Alb 3


                                                 .8


     Nutritional Hx/Data


      Height                                     5 ft 11 in


      Height (Calculated Centimeters)            180.3


      Current Weight (lbs)                       180 lb


      Weight (Calculated Kilograms)              81.6


      Weight (Calculated Grams)                  72802.6


      Ideal Body Weight                          75.3 kg


      % Ideal Body Weight                        108


      Body Mass Index (BMI)                      25.1


      Weight Status                              Overweight


     GI Symptoms


      GI Symptoms                                None


      Last BM                                    8/1 x1


      Skin Integrity/Comment:                    WNL, Intact


                                                 William: 15


      Current %PO                                Fair (50-74%)


     Estimated Nutritional Goals


      BEE in Kcals:                              Adj wt of IBW


      Calories/Kcals/Kg                          20-25


      Kcals Calculated                           2425-4233


      Protein:                                   Adj wt of IBW


      Protein g/k.8-0.9


      Protein Calculated                         62-70


      Fluid: ml                                  5520-7791 ml (25-30 ml/kg)


     Nutritional Problem


      No current Nutrition Prob


       Problem                                   N/A


       Etiology                                  N/A


       Signs/Symptoms:                           N/A


     Malnutrition Related to Morbid Obesity


      Malnutrition related to morbid obesity     No


     Intervention/Recommendation


      Comments                                   1.Continue with mechanical


                                                 soft diet as ordered.


     Expected Outcomes/Goals


      Expected Outcomes/Goals                    1.PO intake to continue to


                                                 meet 75% of nutritional needs.


                                                 2.Monitor PO intake, wt,


                                                 nutrition related labs, and


                                                 skin integrity.


                                                 3.F/U as low risk in 7 days,

## 2019-08-07 NOTE — INTERNAL MEDICINE PROG NOTE
Internal Medicine Subjective





- Subjective


Service Date: 19


Patient seen and examined:: chart reviewed


Patient is:: awake, verbal, agitated


Patient Complaints of:: other (Psychosis.)


Per staff patient has:: no adverse event, no episodes of fall





Internal Medicine Objective





- Results


Result Diagrams: 


 19 18:57





 19 18:57


Recent Labs: 


 Laboratory Last Values











WBC  4.7 Th/cmm (4.8-10.8)  L  19  18:57    


 


RBC  3.98 Mil/cmm (3.80-5.80)   19  18:57    


 


Hgb  12.6 gm/dL (12-16)   19  18:57    


 


Hct  37.5 % (41.0-60)  L  19  18:57    


 


MCV  94.3 fl (80-99)   19  18:57    


 


MCH  31.7 pg (27.0-31.0)  H  19  18:57    


 


MCHC Differential  33.6 pg (28.0-36.0)   19  18:57    


 


RDW  13.9 % (11.5-20.0)   19  18:57    


 


Plt Count  224 Th/cmm (150-400)   19  18:57    


 


MPV  5.8 fl  19  18:57    


 


Neutrophils %  64.1 % (40.0-80.0)   19  18:57    


 


Lymphocytes %  25.5 % (20.0-50.0)   19  18:57    


 


Monocytes %  8.8 % (2.0-10.0)   19  18:57    


 


Eosinophils %  1.4 % (0.0-5.0)   19  18:57    


 


Basophils %  0.2 % (0.0-2.0)   19  18:57    


 


PT  10.9 SECONDS (9.5-11.5)   19  18:57    


 


INR  1.05  (0.5-1.4)   19  18:57    


 


PTT (Actin FS)  29.4 SECONDS (26.0-38.0)   19  18:57    


 


Sodium  128 mEq/L (136-145)  L  19  18:57    


 


Potassium  3.7 mEq/L (3.5-5.1)   19  18:57    


 


Chloride  92 mEq/L ()  L  19  18:57    


 


Carbon Dioxide  30.7 mEq/L (21.0-31.0)   19  18:57    


 


Anion Gap  9.0  (7.0-16.0)   19  18:57    


 


BUN  15 mg/dL (7-25)   19  18:57    


 


Creatinine  0.9 mg/dL (0.7-1.3)   19  18:57    


 


Est GFR ( Amer)  TNP   19  18:57    


 


Est GFR (Non-Af Amer)  TNP   19  18:57    


 


BUN/Creatinine Ratio  16.7   19  18:57    


 


Glucose  110 mg/dL ()  H  19  18:57    


 


Calcium  9.3 mg/dL (8.6-10.3)   19  18:57    


 


Total Bilirubin  0.7 mg/dL (0.3-1.0)   19  18:57    


 


AST  13 U/L (13-39)   19  18:57    


 


ALT  6 U/L (7-52)  L  19  18:57    


 


Alkaline Phosphatase  74 U/L ()   19  18:57    


 


Troponin I  0.03 ng/mL (0.01-0.05)   19  18:57    


 


Total Protein  6.6 gm/dL (6.0-8.3)   19  18:57    


 


Albumin  3.8 gm/dL (4.2-5.5)  L  19  18:57    


 


Globulin  2.8 gm/dL  19  18:57    


 


Albumin/Globulin Ratio  1.4  (1.0-1.8)   19  18:57    


 


Triglycerides  54 mg/dL (<150)   19  18:57    


 


Cholesterol  120 mg/dL (<200)   19  18:57    


 


LDL Cholesterol Direct  63 mg/dL ()  L  19  18:57    


 


HDL Cholesterol  55 mg/dL (23-92)   19  18:57    


 


Urine Source  CLEAN C   19  13:45    


 


Urine Color  YELLOW   19  13:45    


 


Urine Clarity  CLOUDY  (CLEAR)   19  13:45    


 


Urine pH  5.5  (4.6 - 8.0)   19  13:45    


 


Ur Specific Gravity  1.025  (1.005-1.030)   19  13:45    


 


Urine Protein  NEGATIVE mg/dL (NEGATIVE)   19  13:45    


 


Urine Glucose (UA)  NEGATIVE mg/dL (NEGATIVE)   19  13:45    


 


Urine Ketones  NEGATIVE mg/dL (NEGATIVE)   19  13:45    


 


Urine Blood  TRACE  (NEGATIVE)   19  13:45    


 


Urine Nitrate  POSITIVE  (NEGATIVE)  H  19  13:45    


 


Urine Bilirubin  NEGATIVE  (NEGATIVE)   19  13:45    


 


Urine Urobilinogen  0.2 E.U./dL (0.2 - 1.0)   19  13:45    


 


Ur Leukocyte Esterase  SMALL  (NEGATIVE)  H  19  13:45    


 


Urine RBC  2-5 /hpf (0-5)  H  19  13:45    


 


Urine WBC  6-10 /hpf (0-5)   19  13:45    


 


Ur Epithelial Cells  FEW /lpf (FEW)   19  13:45    


 


Urine Bacteria  4+ /hpf (NONE SEEN)  H  19  13:45    














- Physical Exam


Vitals and I&O: 


 Vital Signs











Temp  97.8 F   19 06:18


 


Pulse  68   19 06:18


 


Resp  18   19 06:18


 


BP  112/69   19 08:25


 


Pulse Ox  97   19 06:18








 Intake & Output











 19





 18:59 06:59 18:59


 


Intake Total 800 120 


 


Balance 800 120 


 


Intake:   


 


  Oral 800 120 


 


Other:   


 


  # Voids 3 3 


 


  # Bowel Movements 1 1 











Active Medications: 


Current Medications





Acetaminophen (Tylenol)  650 mg PO Q6H PRN


   PRN Reason: MODERATE PAIN


   Stop: 19 21:48


Acetaminophen (Tylenol Extra Strength)  500 mg PO Q4HR PRN


   PRN Reason: MILD PAIN


   Stop: 19 21:48


Acetaminophen (Tylenol Extra Strength)  1,000 mg PO Q8H PRN


   PRN Reason: Severe Pain


   Stop: 19 21:48


Al Hydrox/Mg Hydrox/Simethicone (Maalox)  30 ml PO Q4HR PRN


   PRN Reason: GI DISTRESS


   Stop: 19 21:36


Aspirin (Ecotrin)  81 mg PO DAILY APURVA


   Stop: 19 08:59


   Last Admin: 19 08:26 Dose:  81 mg


Docusate Sodium (Colace)  250 mg PO DAILY APURVA


   Stop: 19 08:59


   Last Admin: 19 08:25 Dose:  Not Given


Donepezil HCl (Aricept)  10 mg PO HS APURVA


   Stop: 19 20:59


   Last Admin: 19 20:38 Dose:  10 mg


Famotidine (Pepcid)  40 mg PO DAILY Replaced by [facility]


   Stop: 19 08:59


   Last Admin: 19 08:26 Dose:  40 mg


Hydrochlorothiazide (Hctz)  25 mg PO DAILY Replaced by [facility]


   Stop: 19 08:59


   Last Admin: 19 08:25 Dose:  25 mg


Lorazepam (Ativan)  0.5 mg PO Q4HR PRN; Protocol


   PRN Reason: Anxiety


   Stop: 19 21:36


   Last Admin: 19 23:30 Dose:  0.5 mg


Magnesium Hydroxide (Milk Of Magnesia)  30 ml PO HS PRN


   PRN Reason: Constipation


Memantine (Namenda)  10 mg PO BID APURVA


   Stop: 19 08:59


   Last Admin: 19 08:26 Dose:  10 mg


Multivitamins/Vitamin C (Theragran)  1 tab PO DAILY APURVA


   Stop: 19 08:59


   Last Admin: 19 08:25 Dose:  1 tab


Quetiapine Fumarate (Seroquel)  25 mg PO HS APURVA; Protocol


   Stop: 19 20:59


   Last Admin: 19 20:37 Dose:  25 mg


Zolpidem Tartrate (Ambien)  5 mg PO HS PRN


   PRN Reason: Insomnia


   Stop: 19 21:36


   Last Admin: 19 20:38 Dose:  5 mg








Physical Exam: 


Patient continues to very agitated, continue monitoring.





General: weak, demented, NAD


HEENT: NC/AT, PERRLA


Neck: Supple, No JVD


Lungs: CTAB


Cardiovascular: RRR, Normal S1, Normal S2


Abdomen: soft, non-tender, non-distended


Extremities: clear


Neurological: other (Agitated mood.)





Internal Medicine Assmt/Plan





- Assessment


Assessment: 





Dementia.


Psychosis.


Hypertension.


Osteoarthritis.


Gerd.





- Plan


Plan: 


Continuation of care. 


Monitor Labs. 


Continue present meds as directed.


Monitor vitals, continue Blood pressure meds as directed.


Monitor Diet/Nutritional support.


Psych management per Psych.


Pain Management.


PT/OT prn


Safety precaution.


Supportive care. 


Fall precaution, frequent nursing rounds, and as needed restraints to prevent 

fall.


Continue collaborating with consulting specialists, case management and nursing 

team.


Will Monitor patient and continue present care management.











Nutritional Asmnt/Malnutr-PDOC





- Dietary Evaluation


Malnutrition Findings (Please click <Entered> for more info): 








Nutritional Asmnt/Malnutrition                             Start:  19 15:

13


Text:                                                      Status: Complete    

  


Freq:                                                                          

  


Protocol:                                                                      

  


 Document     19 15:13  CHULA  (Rec: 19 15:18  CHULA [email or web address])


 Nutritional Asmnt/Malnutrition


     Patient General Information


      Nutritional Screening                      Moderate Risk


      Diagnosis                                  Psychosis NOS


      Pertinent Medical Hx/Surgical Hx           HTN, PUD/GERD, Dementia,


                                                 Alzheimers disease, Chronic


                                                 anemia, Chronic atrial


                                                 fibrillation, schizophrenia,


                                                 vitamin D deficiency,


                                                 hyperosmolarity, hyponatremia,


                                                 anorexia, iron deficiency


      Subjective Information                     Pt downgraded from moderate


                                                 risk to low risk d/t meal/diet


                                                 order tolerance and meeting


                                                 nutrient needs.


                                                 Pt is a 89-year-old male from


                                                 nursing facility admitted on  c/o increased agitation.


                                                 Per Meal/Nutrition Activity


                                                 Record, Pt PO intake 60% meals


                                                 x 2 days.


                                                 HT: 511


                                                 WT: 180 LB (81.82 kg)


                                                 ADJ WT: 77.9 kg


                                                 BMI: 25.10 (Overweight)


                                                 GI: WNL, Soft, Flat


                                                 BM: 8/1 x1


                                                 I/O: 940/Not Noted


                                                 Skin: WNL, Intact


                                                 William: 15


                                                 Diet Order: Mechanical Soft


                                                 Estimated Energy Needs: (


                                                 Overweight, ADJ BW)


                                                 6421-5472 kcals (20-25 kcals/


                                                 kg)


                                                 62-70 g Pro (0.8-0.9 g/kg)


                                                 5403-7598 ml (25-30 ml/kg)


                                                 Pt is eating 60% of meals Per


                                                 Meal/Nutrition Activity Record


                                                 . Dietary is currently


                                                 providing an estimated 2364


                                                 kcals and 117 gm Pro, per Pt


                                                 PO intake this is providing an


                                                 estimated 1418 kcals and 70


                                                 gm Pro to meet 90% kcal and


                                                 100+% Pro needs- adequate.


      Current Diet Order/ Nutrition Support      Mechanical Soft


      Pertinent Medications                      Maalox (PRN), Colace, Pepcid,


                                                 MOM (PRN), Theragran


      Pertinent Labs                             : Hgb/Hct 12.6/37.5, Na


                                                 128, Cl 92, Glucose 110, Alb 3


                                                 .8


     Nutritional Hx/Data


      Height                                     1.8 m


      Height (Calculated Centimeters)            180.3


      Current Weight (lbs)                       81.647 kg


      Weight (Calculated Kilograms)              81.6


      Weight (Calculated Grams)                  65248.6


      Ideal Body Weight                          75.3 kg


      % Ideal Body Weight                        108


      Body Mass Index (BMI)                      25.1


      Weight Status                              Overweight


     GI Symptoms


      GI Symptoms                                None


      Last BM                                    8/1 x1


      Skin Integrity/Comment:                    WNL, Intact


                                                 William: 15


      Current %PO                                Fair (50-74%)


     Estimated Nutritional Goals


      BEE in Kcals:                              Adj wt of IBW


      Calories/Kcals/Kg                          20-25


      Kcals Calculated                           0051-0422


      Protein:                                   Adj wt of IBW


      Protein g/k.8-0.9


      Protein Calculated                         62-70


      Fluid: ml                                  3199-5110 ml (25-30 ml/kg)


     Nutritional Problem


      No current Nutrition Prob


       Problem                                   N/A


       Etiology                                  N/A


       Signs/Symptoms:                           N/A


     Malnutrition Related to Morbid Obesity


      Malnutrition related to morbid obesity     No


     Intervention/Recommendation


      Comments                                   1.Continue with mechanical


                                                 soft diet as ordered.


     Expected Outcomes/Goals


      Expected Outcomes/Goals                    1.PO intake to continue to


                                                 meet 75% of nutritional needs.


                                                 2.Monitor PO intake, wt,


                                                 nutrition related labs, and


                                                 skin integrity.


                                                 3.F/U as low risk in 7 days,

## 2019-08-08 NOTE — PROGRESS NOTES
DATE:  08/06/2019



SUBJECTIVE:  Chart was reviewed and the patient interviewed.  Also discussed the

patient's condition with the staff and reviewed records and labs.  The patient

is still in irritable mood, and he is still withdrawn and guarded.  The patient

is interacting minimally with peers and with others.  He also seems to be

suspicious and paranoid, but at the same time, he wants to be left alone.  The

patient is more compliant with taking his medications and seems to be slightly

easier to follow directions.  Otherwise, the patient is compliant with taking

medications with no side effects.



ASSESSMENT:  The patient is still irritable but seems to be easier to redirect

him and more cooperative.



TREATMENT PLAN:  Continue to monitor his behavior and his condition closely. 

Also, continue adjusting psychotropic medications and work on discharge plans. 

Also, continue Seroquel 25 mg at bedtime as well as Namenda and Aricept.





DD: 08/07/2019 19:59

DT: 08/08/2019 01:39

JOB# [id number]  [id number]

## 2023-01-15 NOTE — PROGRESS NOTES
DATE:  08/08/2018



SUBJECTIVE:  Staff was spoken to.  The patient is interviewed.  Mood is noted to

be irritable.  Affect is constricted.  Insight and judgment at this time are

noted to be impaired.  Impulse control is noted to be limited.  Coping skills

are noted to be limited.  The patient has been having difficult time to cope

with the stress.  The patient is currently on 25 mg twice a day of the Seroquel

and has been able to tolerate.  No side effects to the medications are noted at

this time.



ASSESSMENT:  The patient is still grossly psychotic and impulsive and has been

displaying grandiose delusions.



PLAN:  To continue the patient with the supportive therapy and followup.





DD: 08/08/2018 19:20

DT: 08/09/2018 01:57

JOB# [id number]  [id number] (4) walks frequently